# Patient Record
Sex: MALE | Employment: PART TIME | URBAN - METROPOLITAN AREA
[De-identification: names, ages, dates, MRNs, and addresses within clinical notes are randomized per-mention and may not be internally consistent; named-entity substitution may affect disease eponyms.]

---

## 2018-03-02 ENCOUNTER — RECORDS - HEALTHEAST (OUTPATIENT)
Dept: LAB | Facility: CLINIC | Age: 53
End: 2018-03-02

## 2018-03-02 LAB
ALBUMIN SERPL-MCNC: 3.7 G/DL (ref 3.5–5)
ALP SERPL-CCNC: 89 U/L (ref 45–120)
ALT SERPL W P-5'-P-CCNC: 22 U/L (ref 0–45)
ANION GAP SERPL CALCULATED.3IONS-SCNC: 9 MMOL/L (ref 5–18)
AST SERPL W P-5'-P-CCNC: 11 U/L (ref 0–40)
BILIRUB SERPL-MCNC: 0.5 MG/DL (ref 0–1)
BUN SERPL-MCNC: 11 MG/DL (ref 8–22)
CALCIUM SERPL-MCNC: 9.2 MG/DL (ref 8.5–10.5)
CHLORIDE BLD-SCNC: 106 MMOL/L (ref 98–107)
CHOLEST SERPL-MCNC: 164 MG/DL
CO2 SERPL-SCNC: 26 MMOL/L (ref 22–31)
CREAT SERPL-MCNC: 0.87 MG/DL (ref 0.7–1.3)
FASTING STATUS PATIENT QL REPORTED: ABNORMAL
GFR SERPL CREATININE-BSD FRML MDRD: >60 ML/MIN/1.73M2
GLUCOSE BLD-MCNC: 113 MG/DL (ref 70–125)
HDLC SERPL-MCNC: 36 MG/DL
LDLC SERPL CALC-MCNC: 104 MG/DL
POTASSIUM BLD-SCNC: 3.8 MMOL/L (ref 3.5–5)
PROT SERPL-MCNC: 6.7 G/DL (ref 6–8)
SODIUM SERPL-SCNC: 141 MMOL/L (ref 136–145)
TRIGL SERPL-MCNC: 120 MG/DL
TSH SERPL DL<=0.005 MIU/L-ACNC: 0.77 UIU/ML (ref 0.3–5)

## 2018-03-05 LAB — 25(OH)D3 SERPL-MCNC: 43.7 NG/ML (ref 30–80)

## 2018-03-06 ENCOUNTER — THERAPY VISIT (OUTPATIENT)
Dept: PHYSICAL THERAPY | Facility: CLINIC | Age: 53
End: 2018-03-06
Payer: MEDICARE

## 2018-03-06 DIAGNOSIS — M25.561 RIGHT KNEE PAIN: Primary | ICD-10-CM

## 2018-03-06 PROCEDURE — 97110 THERAPEUTIC EXERCISES: CPT | Mod: GP | Performed by: PHYSICAL THERAPIST

## 2018-03-06 PROCEDURE — 97162 PT EVAL MOD COMPLEX 30 MIN: CPT | Mod: GP | Performed by: PHYSICAL THERAPIST

## 2018-03-06 PROCEDURE — G8978 MOBILITY CURRENT STATUS: HCPCS | Mod: GP | Performed by: PHYSICAL THERAPIST

## 2018-03-06 PROCEDURE — G8979 MOBILITY GOAL STATUS: HCPCS | Mod: GP | Performed by: PHYSICAL THERAPIST

## 2018-03-06 NOTE — LETTER
"DEPARTMENT OF HEALTH AND HUMAN SERVICES  CENTERS FOR MEDICARE & MEDICAID SERVICES    PLAN/UPDATED PLAN OF PROGRESS FOR OUTPATIENT REHABILITATION    PATIENTS NAME:  Mohsen Najera   : 1965  PROVIDER NUMBER:    [id number]  [id number]  PROVIDER NAME: [place] [facility] PHYSICAL THERAPY  MEDICAL RECORD NUMBER: [id number]   START OF CARE DATE:  SOC Date: 18   TYPE:  PT  PRIMARY/TREATMENT DIAGNOSIS: (Pertinent Medical Diagnosis)  Right knee pain    VISITS FROM START OF CARE:  Rxs Used: 1   Physical Therapy Initial Evaluation   3/6/18  Nevaeh Henderson MD Precautions/Restrictions/MD instructions: Acute pain right knee, up to 12 visits   Therapist Impression:   Pt is a 51 y/o male, with short-term history of R knee pain. Pt presents with pain, reduced ROM, laxity, weakness, gait abnormalities, and balance deficits. These impairments limit their ability to ambulate, navigate stairs, stand, perform work duties, and sleep. Skilled PT services necessary in order to reduce impairments and improve independent function.  Subjective:   Chief Complaint: (**Patient a fair historian and without assistance today). Pt reports that he began having R knee pain in the past week or so. Cannot recall any type of trauma or slip. Notes that standing for long periods of time and navigating stairs are very painful. Notes crepitus with ascending stairs - only navigates them one step at a time. Pt reports that he is prescribed ankle braces but doesn't wear them - reports that they are to help rotate feet forward.  DOI/onset: MD order 3/6/18 DOS: n/a  Location: knee cap Quality: sharp  Frequency: activity related Radiates: none  Pain scale: Rest 0/10 Activity 9/10   Time of day: activity related Sleeping: occasionally can wake him up   Exacerbated by: ascending stairs and standing for long periods of time Relieved by: sitting and \"ignoring it\" Progression: no better if not worse  Previous Treatment: " rest Effect of prior treatment: no change   PMH and/or surgical history: mental illness  Imaging: x ray    PATIENTS NAME:  Mohsen Najera   : 1965  Occupation: [employer] Job duties: prolonged standing   Current HEP/exercise regimen: planning on doing  in  - bikes a lot Patient's goals: be able to reduce pain with daily activity  Medications: pt reports only taking Vitamin D  General health as reported by patient: Good  Return to MD: PRN    Objective:  Knee Evaluation:  Observation:    Stands in tibial ER and trunk flexion    Gait: incr tibial ER and femoral IR    Functional:    Single-Leg Stance: 5 sec on L, unable on R   Double-Leg Squat: tibial ER and femoral IR    Effusion Stroke Test: None    Patella: WNL     PROM:   Knee End Feel   Left 5-0-130 WNL   Right 10-0-115 Painful ext/flex     Special Tests:   L (+/-) R (+/-)   Anterior Drawer - -   Posterior Drawer - -   Lachman's - -   Valgus 0/30 degrees -/- -/-   Varus 0/30 degrees -/- -/-   Laxity Testing - equivocal   Meniscal Testing - -   Patellar Apprehension - -   NAJMA - -   Mohsen - -   Ely's - -   90/90 Hamstring - -       PATIENTS NAME:  Mohsen Najera   : 1965    Strength:   MMT L MMT R   HIP     Ext 3/5 3/5   Abd 3/5 3/5   ER 4/5 4/5   KNEE     Quad Set good Good but painful   SLR good 10 deg lag     Assessment/Plan:    Patient is a 52 year old male with right side knee complaints.    Patient has the following significant findings with corresponding treatment plan.                Diagnosis 1:  R knee pain, laxity  Pain -  hot/cold therapy, manual therapy, splint/taping/bracing/orthotics, education and home program  Decreased ROM/flexibility - manual therapy and therapeutic exercise  Decreased joint mobility - manual therapy and therapeutic exercise  Decreased strength - therapeutic exercise and therapeutic activities  Impaired balance - neuro re-education and therapeutic activities  Decreased  proprioception - neuro re-education and therapeutic activities  Impaired gait - gait training  Impaired muscle performance - neuro re-education  Decreased function - therapeutic activities  Impaired posture - neuro re-education  Instability -  Therapeutic Activity  Therapeutic Exercise    Therapy Evaluation Codes:   1) History comprised of:   Personal factors that impact the plan of care:      Past/current experiences and Time since onset of symptoms.    Comorbidity factors that impact the plan of care are:      Mental illness.     Medications impacting care: None.  2) Examination of Body Systems comprised of:   Body structures and functions that impact the plan of care:      Hip and Knee.   Activity limitations that impact the plan of care are:      Sitting, Squatting/kneeling, Stairs, Standing, Walking and Sleeping.  3) Clinical presentation characteristics are:   Stable/Uncomplicated.  4) Decision-Making    Moderate complexity using standardized patient assessment instrument and/or   measureable assessment of functional outcome.    Cumulative Therapy Evaluation is: Moderate complexity.    PATIENTS NAME:  Mohsen Najera   : 1965    Previous and current functional limitations:  (See Goal Flow Sheet for this information)    Short term and Long term goals: (See Goal Flow Sheet for this information)     Communication ability:  Patient appears to be able to clearly communicate and understand verbal and written communication and follow directions correctly.  Patient is unable to clearly communicate and follow directions.  They will require assistance to perform a home exercise program.  Treatment Explanation - The following has been discussed with the patient:   RX ordered/plan of care  Anticipated outcomes  Possible risks and side effects  This patient would benefit from PT intervention to resume normal activities.   Rehab potential is good.    Frequency:  1 X week, once daily  Duration:  for 6  "weeks  Discharge Plan:  Achieve all LTG.  Independent in home treatment program.  Reach maximal therapeutic benefit.    Please refer to the daily flowsheet for treatment today, total treatment time and time spent performing 1:1 timed codes.           Caregiver Signature/Credentials _____________________________ Date ________        Daron Thomas DPT   I have reviewed and certified the need for these services and plan of treatment while under my care.          PHYSICIAN'S SIGNATURE:   ______________________________________ Date___________     Nevaeh Henderson PA-C    Certification period:  Beginning of Cert date period: 03/06/18 to  End of Cert period date: 06/03/18     Functional Level Progress Report: Please see attached \"Goal Flow sheet for Functional level.\"    ____X____ Continue Services or       ________ DC Services                Service dates: From  SOC Date: 03/06/18 date to present                         "

## 2018-03-06 NOTE — MR AVS SNAPSHOT
"              After Visit Summary   3/6/2018    Mohsen Najera    MRN: [id number]           Patient Information     Date Of Birth          1965        Visit Information        Provider Department      3/6/2018 8:20 AM Daron Thomas PT [facility] Athletic [facility] Physical [facility]        Today's Diagnoses     Right knee pain    -  1       Follow-ups after your visit        Who to contact     If you have questions or need follow up information about today's clinic visit or your schedule please contact [facility] ATHLETIC [facility] PHYSICAL [facility] directly at [phone].  Normal or non-critical lab and imaging results will be communicated to you by [email or web address], letter or phone within 4 business days after the clinic has received the results. If you do not hear from us within 7 days, please contact the clinic through [email or web address] or phone. If you have a critical or abnormal lab result, we will notify you by phone as soon as possible.  Submit refill requests through Artaic or call your pharmacy and they will forward the refill request to us. Please allow 3 business days for your refill to be completed.          Additional Information About Your Visit        MyChart Information     Artaic lets you send messages to your doctor, view your test results, renew your prescriptions, schedule appointments and more. To sign up, go to [email or web address] . Click on \"Log in\" on the left side of the screen, which will take you to the Welcome page. Then click on \"Sign up Now\" on the right side of the page.     You will be asked to enter the access code listed below, as well as some personal information. Please follow the directions to create your username and password.     Your access code is: [email or web address]  Expires: 2018  9:54 AM     Your access code will  in 90 days. If you need help or a new code, please call your [facility] clinic or [phone].        Care EveryWhere ID     This " is your Care EveryWhere ID. This could be used by other organizations to access your [facility] medical records  [id number]         Blood Pressure from Last 3 Encounters:   No data found for BP    Weight from Last 3 Encounters:   No data found for Wt              We Performed the Following     HC PT EVAL, MODERATE COMPLEXITY     ISRAEL CERT REPORT     ISRAEL INITIAL EVAL REPORT     THERAPEUTIC EXERCISES        Primary Care Provider Office Phone # Fax #    Nevaeh GARCIA PURNIMA Henderson [phone]       [facility] [address] [facility] [address] [address]        Equal Access to Services     [facility]: Hadii aad ku hadasho Soomaali, waaxda luqadaha, qaybta kaalmada adeegyada, waxay idiin hayaan adeeg kharasurendra ladario . So [facility] [phone].    ATENCIÓN: Si habla español, tiene a carrizales disposición servicios gratuitos de asistencia lingüística. [facility] [phone].    We comply with applicable federal civil rights laws and [place] laws. We do not discriminate on the basis of race, color, national origin, age, disability, sex, sexual orientation, or gender identity.            Thank you!     Thank you for choosing [facility] PHYSICAL THERAPY  for your care. Our goal is always to provide you with excellent care. Hearing back from our patients is one way we can continue to improve our services. Please take a few minutes to complete the written survey that you may receive in the mail after your visit with us. Thank you!             Your Updated Medication List - Protect others around you: Learn how to safely use, store and throw away your medicines at www.disposemymeds.org.      Notice  As of 3/6/2018  9:54 AM    You have not been prescribed any medications.

## 2018-03-06 NOTE — PROGRESS NOTES
"[place] [facility] Initial Evaluation  Subjective:  Physical Therapy Initial Evaluation   3/6/18  Nevaeh Henderson MD Precautions/Restrictions/MD instructions: Acute pain right knee, up to 12 visits   Therapist Impression:   Pt is a 53 y/o male, with short-term history of R knee pain. Pt presents with pain, reduced ROM, laxity, weakness, gait abnormalities, and balance deficits. These impairments limit their ability to ambulate, navigate stairs, stand, perform work duties, and sleep. Skilled PT services necessary in order to reduce impairments and improve independent function.    Subjective:   Chief Complaint: (**Patient a fair historian and without assistance today). Pt reports that he began having R knee pain in the past week or so. Cannot recall any type of trauma or slip. Notes that standing for long periods of time and navigating stairs are very painful. Notes crepitus with ascending stairs - only navigates them one step at a time. Pt reports that he is prescribed ankle braces but doesn't wear them - reports that they are to help rotate feet forward.  DOI/onset: MD order 3/6/18 DOS: n/a  Location: knee cap Quality: sharp  Frequency: activity related Radiates: none  Pain scale: Rest 0/10 Activity 9/10   Time of day: activity related Sleeping: occasionally can wake him up   Exacerbated by: ascending stairs and standing for long periods of time Relieved by: sitting and \"ignoring it\" Progression: no better if not worse  Previous Treatment: rest Effect of prior treatment: no change   PMH and/or surgical history: mental illness  Imaging: x ray    Occupation: [employer] Job duties: prolonged standing   Current HEP/exercise regimen: planning on doing  in June - bikes a lot Patient's goals: be able to reduce pain with daily activity  Medications: pt reports only taking Vitamin D  General health as reported by patient: Good  Return to MD: PRN    HPI                "     Objective:  Knee Evaluation:  Observation:    Stands in tibial ER and trunk flexion    Gait: incr tibial ER and femoral IR    Functional:    Single-Leg Stance: 5 sec on L, unable on R   Double-Leg Squat: tibial ER and femoral IR    Effusion Stroke Test: None    Patella: WNL     PROM:   Knee End Feel   Left 5-0-130 WNL   Right 10-0-115 Painful ext/flex     Special Tests:   L (+/-) R (+/-)   Anterior Drawer - -   Posterior Drawer - -   Lachman's - -   Valgus 0/30 degrees -/- -/-   Varus 0/30 degrees -/- -/-   Laxity Testing - equivocal   Meniscal Testing - -   Patellar Apprehension - -   NAJMA - -   Mohsen - -   Ely's - -   90/90 Hamstring - -     Strength:   MMT L MMT R   HIP     Ext 3/5 3/5   Abd 3/5 3/5   ER 4/5 4/5   KNEE     Quad Set good Good but painful   SLR good 10 deg lag       System    Physical Exam    General     ROS    Assessment/Plan:    Patient is a 52 year old male with right side knee complaints.    Patient has the following significant findings with corresponding treatment plan.                Diagnosis 1:  R knee pain, laxity  Pain -  hot/cold therapy, manual therapy, splint/taping/bracing/orthotics, education and home program  Decreased ROM/flexibility - manual therapy and therapeutic exercise  Decreased joint mobility - manual therapy and therapeutic exercise  Decreased strength - therapeutic exercise and therapeutic activities  Impaired balance - neuro re-education and therapeutic activities  Decreased proprioception - neuro re-education and therapeutic activities  Impaired gait - gait training  Impaired muscle performance - neuro re-education  Decreased function - therapeutic activities  Impaired posture - neuro re-education  Instability -  Therapeutic Activity  Therapeutic Exercise    Therapy Evaluation Codes:   1) History comprised of:   Personal factors that impact the plan of care:      Past/current experiences and Time since onset of symptoms.    Comorbidity factors that impact the  plan of care are:      Mental illness.     Medications impacting care: None.  2) Examination of Body Systems comprised of:   Body structures and functions that impact the plan of care:      Hip and Knee.   Activity limitations that impact the plan of care are:      Sitting, Squatting/kneeling, Stairs, Standing, Walking and Sleeping.  3) Clinical presentation characteristics are:   Stable/Uncomplicated.  4) Decision-Making    Moderate complexity using standardized patient assessment instrument and/or measureable assessment of functional outcome.  Cumulative Therapy Evaluation is: Moderate complexity.    Previous and current functional limitations:  (See Goal Flow Sheet for this information)    Short term and Long term goals: (See Goal Flow Sheet for this information)     Communication ability:  Patient appears to be able to clearly communicate and understand verbal and written communication and follow directions correctly.  Patient is unable to clearly communicate and follow directions.  They will require assistance to perform a home exercise program.  Treatment Explanation - The following has been discussed with the patient:   RX ordered/plan of care  Anticipated outcomes  Possible risks and side effects  This patient would benefit from PT intervention to resume normal activities.   Rehab potential is good.    Frequency:  1 X week, once daily  Duration:  for 6 weeks  Discharge Plan:  Achieve all LTG.  Independent in home treatment program.  Reach maximal therapeutic benefit.    Please refer to the daily flowsheet for treatment today, total treatment time and time spent performing 1:1 timed codes.

## 2018-03-13 ENCOUNTER — THERAPY VISIT (OUTPATIENT)
Dept: PHYSICAL THERAPY | Facility: CLINIC | Age: 53
End: 2018-03-13
Payer: MEDICARE

## 2018-03-13 DIAGNOSIS — M25.561 RIGHT KNEE PAIN: ICD-10-CM

## 2018-03-13 PROCEDURE — 97112 NEUROMUSCULAR REEDUCATION: CPT | Mod: GP | Performed by: PHYSICAL THERAPIST

## 2018-03-20 ENCOUNTER — THERAPY VISIT (OUTPATIENT)
Dept: PHYSICAL THERAPY | Facility: CLINIC | Age: 53
End: 2018-03-20
Payer: MEDICARE

## 2018-03-20 DIAGNOSIS — M25.561 RIGHT KNEE PAIN: ICD-10-CM

## 2018-03-20 PROCEDURE — 97530 THERAPEUTIC ACTIVITIES: CPT | Mod: GP | Performed by: PHYSICAL THERAPIST

## 2018-03-20 PROCEDURE — 97112 NEUROMUSCULAR REEDUCATION: CPT | Mod: GP | Performed by: PHYSICAL THERAPIST

## 2018-03-27 ENCOUNTER — THERAPY VISIT (OUTPATIENT)
Dept: PHYSICAL THERAPY | Facility: CLINIC | Age: 53
End: 2018-03-27
Payer: MEDICARE

## 2018-03-27 DIAGNOSIS — M25.561 RIGHT KNEE PAIN: ICD-10-CM

## 2018-03-27 PROCEDURE — 97110 THERAPEUTIC EXERCISES: CPT | Mod: GP | Performed by: PHYSICAL THERAPIST

## 2018-03-27 PROCEDURE — 97112 NEUROMUSCULAR REEDUCATION: CPT | Mod: GP | Performed by: PHYSICAL THERAPIST

## 2018-04-19 ENCOUNTER — THERAPY VISIT (OUTPATIENT)
Dept: PHYSICAL THERAPY | Facility: CLINIC | Age: 53
End: 2018-04-19
Payer: MEDICARE

## 2018-04-19 DIAGNOSIS — M25.561 RIGHT KNEE PAIN: ICD-10-CM

## 2018-04-19 PROCEDURE — 97110 THERAPEUTIC EXERCISES: CPT | Mod: GP | Performed by: PHYSICAL THERAPIST

## 2018-04-19 PROCEDURE — 97112 NEUROMUSCULAR REEDUCATION: CPT | Mod: GP | Performed by: PHYSICAL THERAPIST

## 2018-05-10 ENCOUNTER — THERAPY VISIT (OUTPATIENT)
Dept: PHYSICAL THERAPY | Facility: CLINIC | Age: 53
End: 2018-05-10
Payer: MEDICARE

## 2018-05-10 DIAGNOSIS — M25.561 RIGHT KNEE PAIN: ICD-10-CM

## 2018-05-10 PROCEDURE — 97112 NEUROMUSCULAR REEDUCATION: CPT | Mod: GP | Performed by: PHYSICAL THERAPIST

## 2018-05-10 PROCEDURE — G8979 MOBILITY GOAL STATUS: HCPCS | Mod: GP | Performed by: PHYSICAL THERAPIST

## 2018-05-10 PROCEDURE — 97110 THERAPEUTIC EXERCISES: CPT | Mod: GP | Performed by: PHYSICAL THERAPIST

## 2018-05-10 PROCEDURE — G8978 MOBILITY CURRENT STATUS: HCPCS | Mod: GP | Performed by: PHYSICAL THERAPIST

## 2018-05-10 NOTE — LETTER
[facility] ATHLETIC [facility] PHYSICAL THERAPY  [address] [address]  [phone]    May 11, 2018    Re: Mohsen Najera   :   1965  MRN:  [id number]   REFERRING PHYSICIAN:   Nevaeh Henderson    [facility]    Date of Initial Evaluation:  2018  Visits:  Rxs Used: 6  Reason for Referral:  Right knee pain    EVALUATION SUMMARY    Assessment/Plan:    PROGRESS  REPORT    Progress reporting period is from 3/6/18 to 5/10/18.       SUBJECTIVE  Subjective changes noted by patient:   Subjective: Pt reports that he was able to bike 15 miles last Monday without issues. Pt reports that his knee felt good. Pt reports that he still has some pain with going up stairs (3/10).    Current Pain level: 0/10.      Initial Pain level: 6/10.   Changes in function:  Yes (See Goal flowsheet attached for changes in current functional level)  Adverse reaction to treatment or activity: None    OBJECTIVE  Changes noted in objective findings:  Yes,   Objective: 11 sit to stands in 30 sec w/o pain. No pain with knee ROM.     ASSESSMENT/PLAN  Updated problem list and treatment plan: Diagnosis 1:  R knee pain  Pain -  home program  Decreased strength - home program  Impaired muscle performance - home program  Decreased function - home program  STG/LTGs have been met or progress has been made towards goals:  Yes (See Goal flow sheet completed today.)  Assessment of Progress: The patient's condition is improving.  The patient has met all of their long term goals.  Self Management Plans:  Patient is independent in a home treatment program.  Mohsen continues to require the following intervention to meet STG and LTG's:  PT intervention is no longer required to meet STG/LTG.    Re: Mohsen Najera   :   1965      Recommendations:  This patient is ready to be discharged from therapy and continue their home treatment program.    Please refer to the  daily flowsheet for treatment today, total treatment time and time spent performing 1:1 timed codes.    Thank you for your referral.    INQUIRIES  Therapist: Daron Thomas DPT   [facility] PHYSICAL THERAPY  [address] [place] [id number]  Phone: [phone]  Fax: [phone]

## 2018-05-10 NOTE — PROGRESS NOTES
Subjective:  HPI                    Objective:  System    Physical Exam    General     ROS    Assessment/Plan:    PROGRESS  REPORT    Progress reporting period is from 3/6/18 to 5/10/18.       SUBJECTIVE  Subjective changes noted by patient:   Subjective: Pt reports that he was able to bike 15 miles last Monday without issues. Pt reports that his knee felt good. Pt reports that he still has some pain with going up stairs (3/10).    Current Pain level: 0/10.      Initial Pain level: 6/10.   Changes in function:  Yes (See Goal flowsheet attached for changes in current functional level)  Adverse reaction to treatment or activity: None    OBJECTIVE  Changes noted in objective findings:  Yes,   Objective: 11 sit to stands in 30 sec w/o pain. No pain with knee ROM.     ASSESSMENT/PLAN  Updated problem list and treatment plan: Diagnosis 1:  R knee pain  Pain -  home program  Decreased strength - home program  Impaired muscle performance - home program  Decreased function - home program  STG/LTGs have been met or progress has been made towards goals:  Yes (See Goal flow sheet completed today.)  Assessment of Progress: The patient's condition is improving.  The patient has met all of their long term goals.  Self Management Plans:  Patient is independent in a home treatment program.  Mohsen continues to require the following intervention to meet STG and LTG's:  PT intervention is no longer required to meet STG/LTG.    Recommendations:  This patient is ready to be discharged from therapy and continue their home treatment program.    Please refer to the daily flowsheet for treatment today, total treatment time and time spent performing 1:1 timed codes.

## 2018-05-10 NOTE — MR AVS SNAPSHOT
"              After Visit Summary   5/10/2018    Mohsen Najera    MRN: [id number]           Patient Information     Date Of Birth          1965        Visit Information        Provider Department      5/10/2018 3:20 PM Daron Thomas PT [facility]        Today's Diagnoses     Right knee pain           Follow-ups after your visit        Who to contact     If you have questions or need follow up information about today's clinic visit or your schedule please contact [facility] directly at [phone].  Normal or non-critical lab and imaging results will be communicated to you by [employer], letter or phone within 4 business days after the clinic has received the results. If you do not hear from us within 7 days, please contact the clinic through [employer]im[employer] or phone. If you have a critical or abnormal lab result, we will notify you by phone as soon as possible.  Submit refill requests through [employer] or call your pharmacy and they will forward the refill request to us. Please allow 3 business days for your refill to be completed.          Additional Information About Your Visit        MyChart Information     [employer] lets you send messages to your doctor, view your test results, renew your prescriptions, schedule appointments and more. To sign up, go to [email or web address] . Click on \"Log in\" on the left side of the screen, which will take you to the Welcome page. Then click on \"Sign up Now\" on the right side of the page.     You will be asked to enter the access code listed below, as well as some personal information. Please follow the directions to create your username and password.     Your access code is: [email or web address]  Expires: 2018 10:54 AM     Your access code will  in 90 days. If you need help or a new code, please call your [facility] clinic or [phone].        Care EveryWhere ID     This is " your Care EveryWhere ID. This could be used by other organizations to access your [facility] medical records  [id number]         Blood Pressure from Last 3 Encounters:   No data found for BP    Weight from Last 3 Encounters:   No data found for Wt              We Performed the Following     [facility] PROGRESS NOTES REPORT     NEUROMUSCULAR RE-EDUCATION     THERAPEUTIC EXERCISES        Primary Care Provider Office Phone # Fax #    Nevaeh Henderson PA-C [phone]       [facility] [address] [address]        Equal Access to Services     DARSHAN LARIOS : Hadii aad ku hadasho Soomaali, waaxda luqadaha, qaybta kaalmada adeegyada, waxay idiin hayaan adeeg kharash la'clive . So [facility] [phone].    ATENCIÓN: Si habla español, tiene a carrizales disposición servicios gratuitos de asistencia lingüística. Ll[facility] [phone].    We comply with applicable federal civil rights laws and [place] laws. We do not discriminate on the basis of race, color, national origin, age, disability, sex, sexual orientation, or gender identity.            Thank you!     Thank you for choosing [facility]  for your care. Our goal is always to provide you with excellent care. Hearing back from our patients is one way we can continue to improve our services. Please take a few minutes to complete the written survey that you may receive in the mail after your visit with us. Thank you!             Your Updated Medication List - Protect others around you: Learn how to safely use, store and throw away your medicines at www.disposemymeds.org.      Notice  As of 5/10/2018  3:48 PM    You have not been prescribed any medications.

## 2019-03-29 ENCOUNTER — RECORDS - HEALTHEAST (OUTPATIENT)
Dept: LAB | Facility: CLINIC | Age: 54
End: 2019-03-29

## 2019-03-29 LAB
ANION GAP SERPL CALCULATED.3IONS-SCNC: 11 MMOL/L (ref 5–18)
BUN SERPL-MCNC: 14 MG/DL (ref 8–22)
CALCIUM SERPL-MCNC: 9.8 MG/DL (ref 8.5–10.5)
CHLORIDE BLD-SCNC: 108 MMOL/L (ref 98–107)
CO2 SERPL-SCNC: 22 MMOL/L (ref 22–31)
CREAT SERPL-MCNC: 0.94 MG/DL (ref 0.7–1.3)
GFR SERPL CREATININE-BSD FRML MDRD: >60 ML/MIN/1.73M2
GLUCOSE BLD-MCNC: 119 MG/DL (ref 70–125)
POTASSIUM BLD-SCNC: 4 MMOL/L (ref 3.5–5)
SODIUM SERPL-SCNC: 141 MMOL/L (ref 136–145)

## 2019-04-01 LAB — 25(OH)D3 SERPL-MCNC: 26.6 NG/ML (ref 30–80)

## 2019-11-19 ENCOUNTER — RECORDS - HEALTHEAST (OUTPATIENT)
Dept: LAB | Facility: CLINIC | Age: 54
End: 2019-11-19

## 2019-11-19 LAB — PSA SERPL-MCNC: 1.2 NG/ML (ref 0–3.5)

## 2019-11-20 LAB — 25(OH)D3 SERPL-MCNC: 24.4 NG/ML (ref 30–80)

## 2020-08-04 ENCOUNTER — RECORDS - HEALTHEAST (OUTPATIENT)
Dept: LAB | Facility: CLINIC | Age: 55
End: 2020-08-04

## 2020-08-04 LAB
ALBUMIN SERPL-MCNC: 3.9 G/DL (ref 3.5–5)
ALP SERPL-CCNC: 75 U/L (ref 45–120)
ALT SERPL W P-5'-P-CCNC: 30 U/L (ref 0–45)
ANION GAP SERPL CALCULATED.3IONS-SCNC: 11 MMOL/L (ref 5–18)
AST SERPL W P-5'-P-CCNC: 15 U/L (ref 0–40)
BILIRUB SERPL-MCNC: 0.6 MG/DL (ref 0–1)
BUN SERPL-MCNC: 11 MG/DL (ref 8–22)
CALCIUM SERPL-MCNC: 8.7 MG/DL (ref 8.5–10.5)
CHLORIDE BLD-SCNC: 104 MMOL/L (ref 98–107)
CHOLEST SERPL-MCNC: 173 MG/DL
CO2 SERPL-SCNC: 24 MMOL/L (ref 22–31)
CREAT SERPL-MCNC: 0.94 MG/DL (ref 0.7–1.3)
FASTING STATUS PATIENT QL REPORTED: ABNORMAL
GFR SERPL CREATININE-BSD FRML MDRD: >60 ML/MIN/1.73M2
GLUCOSE BLD-MCNC: 99 MG/DL (ref 70–125)
HDLC SERPL-MCNC: 30 MG/DL
LDLC SERPL CALC-MCNC: 110 MG/DL
POTASSIUM BLD-SCNC: 3.6 MMOL/L (ref 3.5–5)
PROT SERPL-MCNC: 6.8 G/DL (ref 6–8)
SODIUM SERPL-SCNC: 139 MMOL/L (ref 136–145)
TRIGL SERPL-MCNC: 166 MG/DL

## 2022-06-22 ENCOUNTER — LAB REQUISITION (OUTPATIENT)
Dept: LAB | Facility: CLINIC | Age: 57
End: 2022-06-22
Payer: MEDICARE

## 2022-06-22 DIAGNOSIS — K21.9 GASTRO-ESOPHAGEAL REFLUX DISEASE WITHOUT ESOPHAGITIS: ICD-10-CM

## 2022-06-22 DIAGNOSIS — I10 ESSENTIAL (PRIMARY) HYPERTENSION: ICD-10-CM

## 2022-06-22 PROCEDURE — 80048 BASIC METABOLIC PNL TOTAL CA: CPT | Mod: ORL | Performed by: PHYSICIAN ASSISTANT

## 2022-06-22 PROCEDURE — 83735 ASSAY OF MAGNESIUM: CPT | Mod: ORL | Performed by: PHYSICIAN ASSISTANT

## 2022-06-23 LAB
ANION GAP SERPL CALCULATED.3IONS-SCNC: 9 MMOL/L (ref 5–18)
BUN SERPL-MCNC: 16 MG/DL (ref 8–22)
CALCIUM SERPL-MCNC: 9.1 MG/DL (ref 8.5–10.5)
CHLORIDE BLD-SCNC: 109 MMOL/L (ref 98–107)
CO2 SERPL-SCNC: 24 MMOL/L (ref 22–31)
CREAT SERPL-MCNC: 0.96 MG/DL (ref 0.7–1.3)
GFR SERPL CREATININE-BSD FRML MDRD: >90 ML/MIN/1.73M2
GLUCOSE BLD-MCNC: 113 MG/DL (ref 70–125)
MAGNESIUM SERPL-MCNC: 2 MG/DL (ref 1.8–2.6)
POTASSIUM BLD-SCNC: 4.1 MMOL/L (ref 3.5–5)
SODIUM SERPL-SCNC: 142 MMOL/L (ref 136–145)

## 2022-11-04 ENCOUNTER — LAB REQUISITION (OUTPATIENT)
Dept: LAB | Facility: CLINIC | Age: 57
End: 2022-11-04
Payer: MEDICARE

## 2022-11-04 DIAGNOSIS — E55.9 VITAMIN D DEFICIENCY, UNSPECIFIED: ICD-10-CM

## 2022-11-04 DIAGNOSIS — I10 ESSENTIAL (PRIMARY) HYPERTENSION: ICD-10-CM

## 2022-11-04 DIAGNOSIS — Z13.220 ENCOUNTER FOR SCREENING FOR LIPOID DISORDERS: ICD-10-CM

## 2022-11-04 DIAGNOSIS — K21.9 GASTRO-ESOPHAGEAL REFLUX DISEASE WITHOUT ESOPHAGITIS: ICD-10-CM

## 2022-11-04 LAB
ANION GAP SERPL CALCULATED.3IONS-SCNC: 12 MMOL/L (ref 7–15)
BUN SERPL-MCNC: 13.5 MG/DL (ref 6–20)
CALCIUM SERPL-MCNC: 9.6 MG/DL (ref 8.6–10)
CHLORIDE SERPL-SCNC: 104 MMOL/L (ref 98–107)
CHOLEST SERPL-MCNC: 194 MG/DL
CREAT SERPL-MCNC: 0.97 MG/DL (ref 0.67–1.17)
DEPRECATED HCO3 PLAS-SCNC: 22 MMOL/L (ref 22–29)
GFR SERPL CREATININE-BSD FRML MDRD: >90 ML/MIN/1.73M2
GLUCOSE SERPL-MCNC: 106 MG/DL (ref 70–99)
HDLC SERPL-MCNC: 37 MG/DL
LDLC SERPL CALC-MCNC: 115 MG/DL
MAGNESIUM SERPL-MCNC: 1.8 MG/DL (ref 1.7–2.3)
NONHDLC SERPL-MCNC: 157 MG/DL
POTASSIUM SERPL-SCNC: 4.3 MMOL/L (ref 3.4–5.3)
SODIUM SERPL-SCNC: 138 MMOL/L (ref 136–145)
TRIGL SERPL-MCNC: 210 MG/DL

## 2022-11-04 PROCEDURE — 80048 BASIC METABOLIC PNL TOTAL CA: CPT | Mod: ORL | Performed by: PHYSICIAN ASSISTANT

## 2022-11-04 PROCEDURE — 83735 ASSAY OF MAGNESIUM: CPT | Mod: ORL | Performed by: PHYSICIAN ASSISTANT

## 2022-11-04 PROCEDURE — 80061 LIPID PANEL: CPT | Mod: ORL | Performed by: PHYSICIAN ASSISTANT

## 2022-11-04 PROCEDURE — 82306 VITAMIN D 25 HYDROXY: CPT | Mod: ORL | Performed by: PHYSICIAN ASSISTANT

## 2022-11-05 LAB — DEPRECATED CALCIDIOL+CALCIFEROL SERPL-MC: 16 UG/L (ref 20–75)

## 2022-12-06 ENCOUNTER — TRANSFERRED RECORDS (OUTPATIENT)
Dept: HEALTH INFORMATION MANAGEMENT | Facility: CLINIC | Age: 57
End: 2022-12-06

## 2022-12-07 ENCOUNTER — MEDICAL CORRESPONDENCE (OUTPATIENT)
Dept: HEALTH INFORMATION MANAGEMENT | Facility: CLINIC | Age: 57
End: 2022-12-07

## 2022-12-14 ENCOUNTER — TRANSFERRED RECORDS (OUTPATIENT)
Dept: HEALTH INFORMATION MANAGEMENT | Facility: CLINIC | Age: 57
End: 2022-12-14

## 2022-12-29 ENCOUNTER — TRANSCRIBE ORDERS (OUTPATIENT)
Dept: OTHER | Age: 57
End: 2022-12-29

## 2022-12-29 ENCOUNTER — TELEPHONE (OUTPATIENT)
Dept: OPHTHALMOLOGY | Facility: CLINIC | Age: 57
End: 2022-12-29

## 2022-12-29 ENCOUNTER — MEDICAL CORRESPONDENCE (OUTPATIENT)
Dept: HEALTH INFORMATION MANAGEMENT | Facility: CLINIC | Age: 57
End: 2022-12-29

## 2022-12-29 DIAGNOSIS — H02.432 PARALYTIC PTOSIS OF LEFT EYELID: Primary | ICD-10-CM

## 2022-12-29 NOTE — TELEPHONE ENCOUNTER
[facility] Call Center    Phone Message    May a detailed message be left on voicemail: no     Reason for Call: Appointment Intake    Referring Provider Name: Saleem Sanchez   [address] [address]  Phone: [phone], Fax: [phone]  Diagnosis and/or Symptoms: Neuro Ophth - Paralytic ptosis of left eyelid    This referral came over as Urgent for pt to be seen within 3-5 days.  Please review    Action Taken: Message routed to:  [facility]    Travel Screening: Not Applicable

## 2022-12-30 NOTE — TELEPHONE ENCOUNTER
Called x 2 and LVM     Mohsen can make an appointment with any neuro ophth for next available     Suzie Roa Communication Facilitator on 12/30/2022 at 10:37 AM

## 2023-01-10 ENCOUNTER — TELEPHONE (OUTPATIENT)
Dept: OPHTHALMOLOGY | Facility: CLINIC | Age: 58
End: 2023-01-10

## 2023-01-10 NOTE — TELEPHONE ENCOUNTER
I called [facility]     We called Mohsen 12/29 and 1/5     We have tried calling this pt many times and we left messages with the call center number for Mohsen Connolly can be scheduled for next available with neuro     Suzie Roa Communication Facilitator on 1/10/2023 at 10:47 AM

## 2023-01-10 NOTE — TELEPHONE ENCOUNTER
[facility] Call Center    Phone Message    May a detailed message be left on voicemail: yes     Reason for Call: Appointment Intake    Referring Provider Name: Saleem Garcia PA-C   Diagnosis and/or Symptoms: Urgent 3-5 Days referral for Neuro-Oph. regarding Neuro Ophth - Paralytic ptosis of left eyelid        Action Taken: Message routed to:  [facility]    Travel Screening: Not Applicable

## 2023-01-27 ENCOUNTER — LAB REQUISITION (OUTPATIENT)
Dept: LAB | Facility: CLINIC | Age: 58
End: 2023-01-27
Payer: MEDICARE

## 2023-01-27 DIAGNOSIS — G80.9 CEREBRAL PALSY, UNSPECIFIED (H): ICD-10-CM

## 2023-01-27 LAB — TSH SERPL DL<=0.005 MIU/L-ACNC: 1.03 UIU/ML (ref 0.3–4.2)

## 2023-01-27 PROCEDURE — 84443 ASSAY THYROID STIM HORMONE: CPT | Mod: ORL | Performed by: PHYSICIAN ASSISTANT

## 2023-02-06 ENCOUNTER — LAB (OUTPATIENT)
Dept: LAB | Facility: CLINIC | Age: 58
End: 2023-02-06
Attending: PHYSICIAN ASSISTANT
Payer: MEDICARE

## 2023-02-06 ENCOUNTER — OFFICE VISIT (OUTPATIENT)
Dept: OPHTHALMOLOGY | Facility: CLINIC | Age: 58
End: 2023-02-06
Attending: PHYSICIAN ASSISTANT
Payer: MEDICARE

## 2023-02-06 DIAGNOSIS — H02.403 INVOLUTIONAL PTOSIS, ACQUIRED, BILATERAL: ICD-10-CM

## 2023-02-06 DIAGNOSIS — G70.00 MYASTHENIA GRAVIS (H): ICD-10-CM

## 2023-02-06 DIAGNOSIS — H02.432 PARALYTIC PTOSIS OF LEFT EYELID: Primary | ICD-10-CM

## 2023-02-06 DIAGNOSIS — H02.432 PARALYTIC PTOSIS OF LEFT EYELID: ICD-10-CM

## 2023-02-06 PROCEDURE — 83516 IMMUNOASSAY NONANTIBODY: CPT

## 2023-02-06 PROCEDURE — 92060 SENSORIMOTOR EXAMINATION: CPT | Mod: 26 | Performed by: OPHTHALMOLOGY

## 2023-02-06 PROCEDURE — 92285 EXTERNAL OCULAR PHOTOGRAPHY: CPT | Performed by: OPHTHALMOLOGY

## 2023-02-06 PROCEDURE — 83519 RIA NONANTIBODY: CPT

## 2023-02-06 PROCEDURE — 86256 FLUORESCENT ANTIBODY TITER: CPT

## 2023-02-06 PROCEDURE — 86255 FLUORESCENT ANTIBODY SCREEN: CPT

## 2023-02-06 PROCEDURE — 92060 SENSORIMOTOR EXAMINATION: CPT | Performed by: OPHTHALMOLOGY

## 2023-02-06 PROCEDURE — G0463 HOSPITAL OUTPT CLINIC VISIT: HCPCS | Mod: 25

## 2023-02-06 PROCEDURE — 99204 OFFICE O/P NEW MOD 45 MIN: CPT | Mod: GC | Performed by: OPHTHALMOLOGY

## 2023-02-06 PROCEDURE — 36415 COLL VENOUS BLD VENIPUNCTURE: CPT

## 2023-02-06 ASSESSMENT — VISUAL ACUITY
OS_SC: 20/60
METHOD: SNELLEN - LINEAR TF
OD_SC: 20/60
OD_CC: 20/20
OS_CC: 20/20
OS_SC+: -2

## 2023-02-06 ASSESSMENT — REFRACTION_WEARINGRX
OD_CYLINDER: +1.25
OS_CYLINDER: +0.75
OD_AXIS: 090
OS_SPHERE: -2.25
OS_AXIS: 115
OD_SPHERE: -3.00

## 2023-02-06 ASSESSMENT — LAGOPHTHALMOS
OS_LAGOPHTHALMOS: 0
OD_LAGOPHTHALMOS: 0

## 2023-02-06 ASSESSMENT — LEVATOR FUNCTION
OS_LEVATOR: 2
OD_LEVATOR: 12

## 2023-02-06 ASSESSMENT — TONOMETRY
OD_IOP_MMHG: 14
OS_IOP_MMHG: 16
IOP_METHOD: ICARE

## 2023-02-06 ASSESSMENT — MARGIN REFLEX DISTANCE
OD_MRD2: 4
OD_MRD1: 1
OS_MRD1: -3

## 2023-02-06 NOTE — NURSING NOTE
Chief Complaint(s) and History of Present Illness(es)     Droopy Eye Lid Evaluation            Laterality: right upper lid and left upper lid    Associated signs and symptoms: Negative for blurred vision, double vision and eye pain    Comments: Ptosis started in October 2022  MRI/MRA were normal  MG labs - other provider mentioned they would order MG labs but I do not see the results in the chart  PMHx: hx of CP. Has been falling more often since ptosis, but they are not sure if due to weakness or vision.  No changes with swallowing or speech.   No diplopia.   Margarito notices that eyelid height improves when he takes a warm shower sometimes.

## 2023-02-06 NOTE — NURSING NOTE
Chief Complaint(s) and History of Present Illness(es)     Droopy Eye Lid Evaluation            Laterality: right upper lid and left upper lid    Associated signs and symptoms: Negative for blurred vision, double vision and eye pain    Comments: Ptosis started in October 2022  MRI/MRA were normal  MG labs - other provider mentioned they would order MG labs but I do not see the results in the chart  PMHx: hx of CP. Has been falling more often since ptosis, but they are not sure if due to weakness or vision.  No changes with swallowing or speech.   No diplopia.

## 2023-02-06 NOTE — PROGRESS NOTES
Oculoplastic Clinic New Patient    Patient: Mohsen Najera MRN# [id number]   YOB: 1965 Age: 57 year old   Date of Visit: Feb 6, 2023    CC: Droopy eyelids obstructing vision.              HPI:     Chief Complaint(s) and History of Present Illness(es)     Droopy Eye Lid Evaluation            Laterality: right upper lid and left upper lid    Associated signs and symptoms: Negative for blurred vision, double vision and eye pain  Comments: Ptosis started in October 2022  MRI/MRA were normal MG labs - other provider mentioned they would order MG labs but I do not see the results in the chart  PMHx: hx of CP. Has been falling more often since ptosis, but they are not sure if due to weakness or vision.  No changes with swallowing or speech.   No diplopia.   Margarito notices that eyelid height improves when he takes a warm shower   sometimes.          Patient here with friend/manager of his supportive apartment who is helping provide history. Patient noticing drooping of left eyelid since October 2022. Has always had drooping of both upper eyelids and right eye appears at baseline. They state the left eye just suddenly drooped shut and has not gotten any worse. It occasionally opens back up slightly after a warm shower. Has also had some weakness and soreness on his left side. No diplopia, difficulty swallowing or changes in speech.    The droopy eyelid is interfering with activities of daily living including walking, balance and reading. Has had more falls since increase in drooping of left upper lid.    EXAM:       Dermatochalasis with excess skin touching eyelashes no  Brow ptosis with brow resting below superior orbital rim yes    VISUAL FIELD:  Unable to obtain due to cognitive delay.     Assessment & Plan     Mohsen Najera is a 57 year old male with the following diagnoses:   1. Paralytic ptosis of left eyelid    2. Involutional ptosis, acquired, bilateral       Paralytic ptosis, left upper eyelid>>  right upper eyelid.  Unusual that they report sudden onset.  MRI/A has been obtained but results are note available to us. We will need to obtain the records.  Brow ptosis, both eyes  - Concern for myasthenia gravis  - Minimal improvement in ptosis with ice testing in clinic today  - Recommend obtaining labs: myasthenia gravis panel  If MRI/A normal (having results faxed), and normal MG labs, consider:     Both upper eyelid ptosis repair (small ellipse of skin, levator advancement)  Number to call Noreen: [phone]  We discussed he has poor levator function on the left so it is possible we will not achieve adequate lift, in which case we can consider frontalis sling.   ANTICOAGULATION:  Not on any anticoagulation     PHOTOS DEMONSTRATE:  Significant dermatochalasis with lids resting on eyelashes and obstructing visual axis  Blepharoptosis  Brow ptosis with thicker brow skin and hairs below the lateral superior orbital rim    Brea Belcher MD  Resident Physician, PGY-2  Department of Ophthalmology    Addendum: MRI/A report were obtained.   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 These are unlikely related to his new onset ptosis. Striated muscle antibodies have returned positive. Binding, blocking and modulating are positive. Given clinically suspicion was high for myasthenia, I will ask him to see neuromuscular neurology. Additionally his care was discussed with neurointerventional radiology (Dr. Melvin)  and they agreed to follow him for his incidentally identified aneurysm.    Attending Physician Attestation: Complete documentation of historical and exam elements from today's encounter can be found in the full encounter summary report (not reduplicated in this progress note). I personally obtained the chief complaint(s) and history of present illness. I confirmed and edited as necessary the review of systems, past  medical/surgical history, family history, social history, and examination findings as documented by others; and I examined the patient myself. I personally reviewed the relevant tests, images, and reports as documented above. I formulated and edited as necessary the assessment and plan and discussed the findings and management plan with the patient. Rakesh Chun MD      Today with Mohsen Najera I reviewed the indications, risks, benefits, and alternatives of the proposed surgical procedure including, but not limited to, failure obtain the desired result  and need for additional surgery, bleeding, infection, loss of vision, loss of the eye, and the remote possibility of permanent damage to any organ system or death with the use of anesthesia.  I provided multiple opportunities for the questions, answered all questions to the best of my ability, and confirmed that my answers and my discussion were understood.

## 2023-02-09 LAB
ACHR BLOCK AB/ACHR TOTAL SFR SER: 50 %
STRIA MUS IGG SER QL IF: DETECTED
STRIA MUS IGG TITR SER IF: ABNORMAL {TITER}

## 2023-02-10 LAB — ACHR BIND AB SER-SCNC: 2.6 NMOL/L

## 2023-02-11 LAB — ACHR MOD AB/ACHR TOTAL SFR SER: 56 %

## 2023-03-29 ENCOUNTER — TELEPHONE (OUTPATIENT)
Dept: OPHTHALMOLOGY | Facility: CLINIC | Age: 58
End: 2023-03-29
Payer: MEDICARE

## 2023-03-29 NOTE — TELEPHONE ENCOUNTER
Mohsen's care coordinator Elias called to get more information about his appointment on 2/6 Dr Todd. Elias was at the appointment but states that the appointment was very vague. She was given a number to call to schedule an appointment but she was unaware that it was with a Neurologist, his appointment is not till August.     Elias said that Mohsen's eyes are worse, he is falling because he cant open his eyes and he is declining. Can someone call and talk with her about the referral to Ricki. And what the next step is with his eyelids      Is there anything we can do to get Mohsen in earlier with Ricki.     Elias's contact info [phone]    Thank you

## 2023-03-30 ENCOUNTER — TELEPHONE (OUTPATIENT)
Dept: OPHTHALMOLOGY | Facility: CLINIC | Age: 58
End: 2023-03-30
Payer: MEDICARE

## 2023-03-30 NOTE — TELEPHONE ENCOUNTER
Spoke with patient's , Elias, regarding provider think patient has myasthenia gravis as the underlying cause of his ptosis. Therefore it needs to be managed medically first, which is why he was sent for neuromuscular neurology evaluation. Rescheduled patient for a sooner appointment and added patient to the wait-list at all locations. Sent new reminder letter to confirmed address.-Per Patient's Elias

## 2023-05-10 ENCOUNTER — OFFICE VISIT (OUTPATIENT)
Dept: NEUROLOGY | Facility: CLINIC | Age: 58
End: 2023-05-10
Payer: MEDICARE

## 2023-05-10 VITALS — HEART RATE: 75 BPM | SYSTOLIC BLOOD PRESSURE: 124 MMHG | DIASTOLIC BLOOD PRESSURE: 83 MMHG

## 2023-05-10 DIAGNOSIS — M53.82 NECK MUSCLE WEAKNESS: Primary | ICD-10-CM

## 2023-05-10 DIAGNOSIS — R25.0 ABNORMAL HEAD MOVEMENTS: ICD-10-CM

## 2023-05-10 PROCEDURE — 99205 OFFICE O/P NEW HI 60 MIN: CPT | Performed by: PSYCHIATRY & NEUROLOGY

## 2023-05-10 RX ORDER — AMLODIPINE BESYLATE 5 MG/1
5 TABLET ORAL DAILY
COMMUNITY
Start: 2023-04-17

## 2023-05-10 RX ORDER — FLUOXETINE 10 MG/1
10 CAPSULE ORAL DAILY
COMMUNITY
Start: 2023-04-24

## 2023-05-10 RX ORDER — SUMATRIPTAN 50 MG/1
50 TABLET, FILM COATED ORAL DAILY PRN
COMMUNITY
Start: 2023-03-02

## 2023-05-10 RX ORDER — ERGOCALCIFEROL 1.25 MG/1
CAPSULE ORAL
COMMUNITY

## 2023-05-10 RX ORDER — ACETAMINOPHEN 500 MG
TABLET ORAL
COMMUNITY

## 2023-05-10 RX ORDER — LORATADINE 10 MG/1
10 TABLET ORAL DAILY
COMMUNITY

## 2023-05-10 NOTE — LETTER
"    5/10/2023         RE: Mohsen Najera  [address]  [place] [address]        Dear Colleague,    Thank you for referring your patient, Mohsen Najera, to the [facility]. Please see a copy of my visit note below.    Department of Neurology  Movement Disorders Division     Patient: Mohsen Najera   MRN: [id number]   : 1965   Date of Visit: 05/10/23     Dear Colleague,     Thank you for referring your patient, Mr. Najera, to the [facility] NEUROLOGY at [facility]. Please see a copy of my visit note below.    Referring Provider: Rakesh Chun MD    History of Present Illness  Mr. Najera is a 57 year old male with PMH of cerebral palsy that presents to Neurology Movement clinic as a new patient for evaluation of involuntary and persistent head nodding.    Patient seen by Rakesh Chun MD on 23 for bilateral ptosis, L > R, improved with a warm shower.      History obtained from patient. Patient accompanied by Noreen, supportive .   Patient reports   Onset: 3-4 months ago became more noticeable and bad when it became to hurt neck and jaw. Piror to he had slight head nods.  Notices eye close more when he is tired. Has to open eye manually to see. Reports neck/head wants to rest on chest. Both began around the same time, about 3-4 months ago.   Character: Head nodding and may be aggressive to \"gentle\". He is able to stop these movements by putting his hand under his chin. Denies a warning prior to these movements, except when he is nodding yes.  Location: head/neck movements   Frequency: Will occur 2 times an hour.   Duration: \"Depends.\" Has lasted up to a minute.   Associated symptoms: Not sure this is associated but has also noticed left side weakness, bilateral ptosis worse when he is tired   Triggers: Saying yes may trigger head movements.   Improved with: extending neck up, " putting hand under chin  Failed therapies: n/a   Previous head/neck/back injury? Yes LOC? denies  FHx: Denies similar symptoms in the family.       Review of Systems:  Other than that mentioned above, the remainder of 12 systems reviewed were negative.    PMH: noncontributory   PSH: noncontributory   FH: noncontributory   SH: noncontributory     Medications:  Current Outpatient Medications   Medication Sig Dispense Refill     acetaminophen (TYLENOL) 500 MG tablet TAKE 2 TABLETS BY MOUTH EVERY 8 HOURS PRN       amLODIPine (NORVASC) 5 MG tablet Take 5 mg by mouth daily       ergocalciferol (ERGOCALCIFEROL) 1.25 MG (84647 UT) capsule TAKE 1 CAPSULE BY MOUTH WEEKLY       FLUoxetine (PROZAC) 10 MG capsule Take 10 mg by mouth daily       loratadine (CLARITIN) 10 MG tablet Take 10 mg by mouth daily       omeprazole (PRILOSEC) 20 MG DR capsule Take 20 mg by mouth daily       SUMAtriptan (IMITREX) 50 MG tablet Take 50 mg by mouth daily as needed           No Known Allergies      Physical Exam:  The patient's  blood pressure is 124/83 and his pulse is 75.    Physical Exam:  GENERAL: alert, active, attentive, appropriately groomed   HEENT: normocephalic, eyes open with no discharge, nares patent, oropharynx clear-no lesions  CHEST: non labored breathing, normal configuration, chest rise equal b/l  EXTREMITIES: no edema/cyanosis in BUE/BLE, distal pulses intact  PSYCH: mood stable     Neurologic Exam:  MENTAL STATUS: Alert and oriented to person, place, time, and situation. Follows commands. Recent and remote memory intact. Attention span and concentration intact. Fund of knowledge intact to current events.   SPEECH: Fluent, intact comprehension and articulation  CN: visual fields intact, EOMIB, bilateral ptosis, unable to perform fatigue test with eyes and he could not open eye, facial sensation intact, facial movement symmetric, hearing grossly intact to conversation, tongue protrudes midline   MOTOR: Moves all extremities  equally against gravity without difficulty with strength in BUE/BLE involving 4/4 ShAbd, 4/4 ElbFlex, 5/5 ElbEx, 4+/4- HipFlex, 5/5 KneeExt, 5/4 KneeFlex, 5/4 ADF, neck extension 4, neck extension 4  Able to carry on in conversation without difficulty, pauses/rests  Involuntary movements:   Agility: Normal finger tapping on right and slower on the left and normal toe tapping b/l  Tone: Normal axial and appendicular tone  REFLEXES (R/L): 2/2  in biceps, brachioradialis, triceps, patellars, achilles; no clonus, toes down going  SENSATION: intact to light touch throughout   COORDINATION: no dysmetria with FTN, HTS  GAIT: able to rise unassisted from a seated position, decreased arm swing and normal stride length, walks with cane, right hip externally rotated with walking, no en bloc turns, no ataxia    Data Reviewed: I have personally reviewed the tests/studies below.   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 Binding, blocking and modulating are positive.     Impression:  Mohsen Najera is a 57 year old male with involuntary head-nodding in setting of muscle weakness with positive labs associated with myasthenia gravis.    Head nod: The movements of the head seem more behavioral in setting of neck weakness. He denies a build up sensation prior to the movements. Movements can be controlled to a certain degree.   Labs positive for Myasthenia gravis including striated muscle antibodies have returned positive. Binding, blocking and modulating are positive.     Plan:   - Treat underlying condition for neck weakness   - Seek immediate medical attention/evaluation if you notice trouble breathing or keeping with conversation, worsening weakness, trouble swallowing   - Continue appointment with Dr. Lion for evaluation of Myasthenia Gravis  - Continue to follow with Dr. Melvin,  neurointerventional radiology, for incidental aneurysm finding   - Will try to see if he is able to seen sooner by Dr. Lion . If this cannot be scheduled sooner with Dr. Lion, will try to schedule with Dr. Thompson or general neurology.    Patient to return prn    Hattie Marcano DO, MA   of Neurology   [facility]     60 minutes spent on date of encounter doing chart reviews and exam and documentation and further activities as noted above.                Again, thank you for allowing me to participate in the care of your patient.        Sincerely,        Hattie Marcano DO

## 2023-05-10 NOTE — NURSING NOTE
"Chief Complaint   Patient presents with     Head nodding     \"Uncontrollable\"  Referred by Dr. Sams         Left side weakness       TAM Murphy on 5/10/2023 at 3:06 PM    "

## 2023-05-10 NOTE — PROGRESS NOTES
"Department of Neurology  Movement Disorders Division     Patient: Mohsen Najera   MRN: [id number]   : 1965   Date of Visit: 05/10/23     Dear Colleague,     Thank you for referring your patient, Mr. Najera, to the [facility] NEUROLOGY at [facility]. Please see a copy of my visit note below.    Referring Provider: Rakesh Chun MD    History of Present Illness  Mr. Najera is a 57 year old male with PMH of cerebral palsy that presents to Neurology Movement clinic as a new patient for evaluation of involuntary and persistent head nodding.    Patient seen by Rakesh Chun MD on 23 for bilateral ptosis, L > R, improved with a warm shower.      History obtained from patient. Patient accompanied by Noreen, supportive .   Patient reports   Onset: 3-4 months ago became more noticeable and bad when it became to hurt neck and jaw. Piror to he had slight head nods.  Notices eye close more when he is tired. Has to open eye manually to see. Reports neck/head wants to rest on chest. Both began around the same time, about 3-4 months ago.   Character: Head nodding and may be aggressive to \"gentle\". He is able to stop these movements by putting his hand under his chin. Denies a warning prior to these movements, except when he is nodding yes.  Location: head/neck movements   Frequency: Will occur 2 times an hour.   Duration: \"Depends.\" Has lasted up to a minute.   Associated symptoms: Not sure this is associated but has also noticed left side weakness, bilateral ptosis worse when he is tired   Triggers: Saying yes may trigger head movements.   Improved with: extending neck up, putting hand under chin  Failed therapies: n/a   Previous head/neck/back injury? Yes LOC? denies  FHx: Denies similar symptoms in the family.       Review of Systems:  Other than that mentioned above, the remainder of 12 systems reviewed were negative.    PMH: noncontributory   PSH: " noncontributory   FH: noncontributory   SH: noncontributory     Medications:  Current Outpatient Medications   Medication Sig Dispense Refill     acetaminophen (TYLENOL) 500 MG tablet TAKE 2 TABLETS BY MOUTH EVERY 8 HOURS PRN       amLODIPine (NORVASC) 5 MG tablet Take 5 mg by mouth daily       ergocalciferol (ERGOCALCIFEROL) 1.25 MG (50480 UT) capsule TAKE 1 CAPSULE BY MOUTH WEEKLY       FLUoxetine (PROZAC) 10 MG capsule Take 10 mg by mouth daily       loratadine (CLARITIN) 10 MG tablet Take 10 mg by mouth daily       omeprazole (PRILOSEC) 20 MG DR capsule Take 20 mg by mouth daily       SUMAtriptan (IMITREX) 50 MG tablet Take 50 mg by mouth daily as needed           No Known Allergies      Physical Exam:  The patient's  blood pressure is 124/83 and his pulse is 75.    Physical Exam:  GENERAL: alert, active, attentive, appropriately groomed   HEENT: normocephalic, eyes open with no discharge, nares patent, oropharynx clear-no lesions  CHEST: non labored breathing, normal configuration, chest rise equal b/l  EXTREMITIES: no edema/cyanosis in BUE/BLE, distal pulses intact  PSYCH: mood stable     Neurologic Exam:  MENTAL STATUS: Alert and oriented to person, place, time, and situation. Follows commands. Recent and remote memory intact. Attention span and concentration intact. Fund of knowledge intact to current events.   SPEECH: Fluent, intact comprehension and articulation  CN: visual fields intact, EOMIB, bilateral ptosis, unable to perform fatigue test with eyes and he could not open eye, facial sensation intact, facial movement symmetric, hearing grossly intact to conversation, tongue protrudes midline   MOTOR: Moves all extremities equally against gravity without difficulty with strength in BUE/BLE involving 4/4 ShAbd, 4/4 ElbFlex, 5/5 ElbEx, 4+/4- HipFlex, 5/5 KneeExt, 5/4 KneeFlex, 5/4 ADF, neck extension 4, neck extension 4  Able to carry on in conversation without difficulty, pauses/rests  Involuntary  movements:   Agility: Normal finger tapping on right and slower on the left and normal toe tapping b/l  Tone: Normal axial and appendicular tone  REFLEXES (R/L): 2/2  in biceps, brachioradialis, triceps, patellars, achilles; no clonus, toes down going  SENSATION: intact to light touch throughout   COORDINATION: no dysmetria with FTN, HTS  GAIT: able to rise unassisted from a seated position, decreased arm swing and normal stride length, walks with cane, right hip externally rotated with walking, no en bloc turns, no ataxia    Data Reviewed: I have personally reviewed the tests/studies below.   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 Binding, blocking and modulating are positive.     Impression:  Mohsen Najera is a 57 year old male with involuntary head-nodding in setting of muscle weakness with positive labs associated with myasthenia gravis.    Head nod: The movements of the head seem more behavioral in setting of neck weakness. He denies a build up sensation prior to the movements. Movements can be controlled to a certain degree.   Labs positive for Myasthenia gravis including striated muscle antibodies have returned positive. Binding, blocking and modulating are positive.     Plan:   - Treat underlying condition for neck weakness   - Seek immediate medical attention/evaluation if you notice trouble breathing or keeping with conversation, worsening weakness, trouble swallowing   - Continue appointment with Dr. Lion for evaluation of Myasthenia Gravis  - Continue to follow with Dr. Melvin, neurointerventional radiology, for incidental aneurysm finding   - Will try to see if he is able to seen sooner by Dr. Lion . If this cannot be scheduled sooner with Dr. Lion, will try to schedule with Dr. Thompson or general neurology.    Patient to return papo Kuhn  REG Marcano DO, MA   of Neurology   [facility]     60 minutes spent on date of encounter doing chart reviews and exam and documentation and further activities as noted above.

## 2023-05-24 NOTE — TELEPHONE ENCOUNTER
RECORDS RECEIVED FROM: internal   REASON FOR VISIT: MG   Date of Appt: 5/31/23   NOTES (FOR ALL VISITS) STATUS DETAILS   OFFICE NOTE from referring provider Internal Dr Marcano @ [facility]rology:  5/10/23   OFFICE NOTE from other specialist Internal Dr Rakesh Chun @ [facility]:  2/6/23   MEDICATION LIST Internal    IMAGING  (FOR ALL VISITS)     MRI (HEAD, NECK, SPINE) PACS [facility]:  MRI Brain 12/14/22  MRA Brain 12/14/22

## 2023-05-31 ENCOUNTER — OFFICE VISIT (OUTPATIENT)
Dept: NEUROLOGY | Facility: CLINIC | Age: 58
End: 2023-05-31
Payer: MEDICARE

## 2023-05-31 ENCOUNTER — PRE VISIT (OUTPATIENT)
Dept: NEUROLOGY | Facility: CLINIC | Age: 58
End: 2023-05-31

## 2023-05-31 ENCOUNTER — TELEPHONE (OUTPATIENT)
Dept: NEUROLOGY | Facility: CLINIC | Age: 58
End: 2023-05-31

## 2023-05-31 VITALS
DIASTOLIC BLOOD PRESSURE: 78 MMHG | SYSTOLIC BLOOD PRESSURE: 125 MMHG | RESPIRATION RATE: 16 BRPM | HEART RATE: 80 BPM | OXYGEN SATURATION: 96 %

## 2023-05-31 DIAGNOSIS — G70.01 MYASTHENIA GRAVIS WITH EXACERBATION (H): Primary | ICD-10-CM

## 2023-05-31 PROCEDURE — 99215 OFFICE O/P EST HI 40 MIN: CPT | Performed by: PSYCHIATRY & NEUROLOGY

## 2023-05-31 RX ORDER — PYRIDOSTIGMINE BROMIDE 60 MG/1
60 TABLET ORAL 3 TIMES DAILY
Qty: 90 TABLET | Refills: 3 | Status: SHIPPED | OUTPATIENT
Start: 2023-05-31 | End: 2023-09-18

## 2023-05-31 RX ORDER — PREDNISONE 10 MG/1
30 TABLET ORAL DAILY
Qty: 10 TABLET | Refills: 3 | Status: SHIPPED | OUTPATIENT
Start: 2023-05-31 | End: 2023-07-03

## 2023-05-31 ASSESSMENT — PAIN SCALES - GENERAL: PAINLEVEL: MILD PAIN (3)

## 2023-05-31 NOTE — LETTER
5/31/2023       RE: Mohsen Najera  [address]  [place] [address]     Dear Colleague,    Thank you for referring your patient, Mohsen Najera, to the [facility] NEUROLOGY CLINIC [place] at [facility]. Please see a copy of my visit note below.    Chief Complaint: myasthenia gravis    History of Present Illness:    Mohsen Najera is a 57 year old man who I am seeing at the request of Dr. Marcano for evaluation of myasthenia gravis. Margarito has a history of cerebral palsy. MG symptoms were first noticed in late 2022. The first symptom was left eyelid ptosis. Over the following weeks and months the right also became affected. Ptosis is fatigable. In the morning they are open but by mid day they are closed. No diplopia. In the spring 2023 he developed head drop. It was mild at first, but now he has a hard time holding his head off his chest. He can lift it up but can not keep it up. He has baseline slurred speech from cerebral palsy, but over the last couple months his speech has worsened. No dysphagia. No limb weakness. He is able to walk and climb stairs normally. No shortness of breath.     In February 2023 he was found to have Achr ab elevations. He has taken no immunotherapies or mestinon.     Prior pertinent laboratory work-up:  2/6/23: ACHr ab 2.6    Prior pertinent radiology work-up:  12/22: MRI brain showed no explanation for his symptoms    Prior electrophysiologic work-up:  None    Past Medical History:   Cerebral palsy  HTN  Myasthenia gravis    Past Surgical History:  None as an adult    Family history:    There is no known family history of hereditary neuropathies or other neuromuscular disorders.    Social History:    Rare alcohol. He denies tobacco or illicit drug use.     Medical Allergies:  No Known Allergies     Current Medications:    Current Outpatient Medications   Medication    acetaminophen (TYLENOL) 500 MG tablet     amLODIPine (NORVASC) 5 MG tablet    ergocalciferol (ERGOCALCIFEROL) 1.25 MG (93619 UT) capsule    FLUoxetine (PROZAC) 10 MG capsule    loratadine (CLARITIN) 10 MG tablet    omeprazole (PRILOSEC) 20 MG DR capsule    SUMAtriptan (IMITREX) 50 MG tablet     No current facility-administered medications for this visit.     Review of Systems: A complete review of systems was obtained and was negative except for what was noted above.    Physical examination:    /78   Pulse 80   Resp 16   SpO2 96%      General Appearance: NAD    Skin: There are no rashes or other skin lesions.    Musculoskeletal:  There is no scoliosis, lordosis, kyphosis, pes cavus, or hammertoes.    Neurologic examination:    Mental status:  Patient is alert, attentive, and oriented x 3.  Language is coherent and fluent without aphasia.  Memory, comprehension and ability to follow commands were intact.       Cranial nerves:  Ptosis present at baseline on both sides. Ptosis worsens with sustained upgaze. No diplopia. Smile is flat and he has trouble puffing his cheeks, but this is his CP baseline. Speech is moderately dysarthric.   Neck extension is 4-/5.     Motor exam: No atrophy or fasciculations. He has baseline left arm and leg weakness from CP. Power in the left arm and leg is generally 4/5. Right arm and leg strength is 5/5.     Complex motor skills: No tremor or ataxia    Sensory exam: Light touch intact in upper and lower limbs    Gait: Narrow and stable, but with head drop.    Deep tendon reflexes:   Right Left   Triceps 2 2   Biceps 2 2   Brachioradialis 2 2   Knee jerk 2 3   Ankle jerk 2 3      Myasthenia gravis outcomes   MG-ADL MG-Composite MG medications   5/31/23 5 9 none     Assessment:    Mohsen Najera is a 57 year old man with ACHr ab positive generalized myasthenia gravis. Thymoma status unknown. This has occurred on the background of cerebral palsy with chronic dysarthria and left arm/leg weakness, but clearly the MG  associated symptoms are different than this. We discussed approaches to treatment. Considering level of disability and impairment symptomatic and immunotherapy is indicated. His group home status presents some barriers, but none that are prohibitive. Will proceed as below.     Plan:      1. CT Chest: Evaluate for thymoma  2. Immunotherapy:   - Start prednisone 15 mg daily. Increase by 5 mg every 2 weeks to maximum dose of 30 mg daily. He can stop escalation at lowest effective dose. If needed, may go above 30 mg but not until we meet next.  - IST: Likely that he will need immunotherapy above prednisone, but will hold off for now. Although data on rituximab is not strong, considering need for only q 6 month (or sometimes less) dosing it may be an attractive option to try. Daily oral MMF is another option. I am less enthusiastic about FcRn or complement inhibitors that need frequent IV or subcutaneous infusions.   3. Symptomatic therapy:   - Start mestinon 60 mg TID. Side effects discussed.  4. Discussed potential MG triggers including heat, surgery, and illness. Certain medications and over the counter preparations may also cause worsening of MG symptoms. I advised him to tell any doctor or dentist about MG diagnosis. A list of cautionary medications can be found at [email or web address]://[email or web address]/What-is-MG/MG-Management/Cautionary-Drugs.  5. Follow up in 2 months. Should he developed worsening before then he should let me know ASAP. In that event acute intervention with IVIG or plex may be indicated.   ----            Again, thank you for allowing me to participate in the care of your patient.      Sincerely,    Jovany Lion MD

## 2023-05-31 NOTE — TELEPHONE ENCOUNTER
[facility] Call Center    Phone Message    May a detailed message be left on voicemail: no     Reason for Call: Other: Noreen calling to confirm the 8/14/23 appointment at 9:30.     Action Taken: Message routed to:  [facility]    Travel Screening: Not Applicable

## 2023-05-31 NOTE — TELEPHONE ENCOUNTER
Patient and  Noreen visited writer in pod 3J to check out. Check out instructions from Dr. Lion indicated follow up in 2 months (around 7/31/2023) however there are no regular openings until December. Scheduled first available 12/06 at 11:00am, routing to clinical team to see if we can use MARY JANE to get patient in closer to 2 months.    Per Noreen, please don't call patient to manage his care/appts. He gets overwhelmed and has trouble relaying what is he told to her. Patient and Noreen requested that we call her instead of patient. C2C is on file for Noreen. Added note to patient's demographics and permanent comments.    Elias Maldonado on 5/31/2023 at 12:22 PM

## 2023-05-31 NOTE — PATIENT INSTRUCTIONS
Start mestinon 60 mg once daily for 2 days, and then twice daily for x days and then three times daily  Start prednisone 15 mg per day for 2 weeks, then 20 mg daily for 2 weeks then 25 mg daily for 2 weeks then 30 mg daily. If your symptoms get better you can stay at the lowest effective dose

## 2023-05-31 NOTE — PROGRESS NOTES
Chief Complaint: myasthenia gravis    History of Present Illness:    Mohsen Najera is a 57 year old man who I am seeing at the request of Dr. Marcano for evaluation of myasthenia gravis. Margarito has a history of cerebral palsy. MG symptoms were first noticed in late 2022. The first symptom was left eyelid ptosis. Over the following weeks and months the right also became affected. Ptosis is fatigable. In the morning they are open but by mid day they are closed. No diplopia. In the spring 2023 he developed head drop. It was mild at first, but now he has a hard time holding his head off his chest. He can lift it up but can not keep it up. He has baseline slurred speech from cerebral palsy, but over the last couple months his speech has worsened. No dysphagia. No limb weakness. He is able to walk and climb stairs normally. No shortness of breath.     In February 2023 he was found to have Achr ab elevations. He has taken no immunotherapies or mestinon.     Prior pertinent laboratory work-up:  2/6/23: ACHr ab 2.6    Prior pertinent radiology work-up:  12/22: MRI brain showed no explanation for his symptoms    Prior electrophysiologic work-up:  None    Past Medical History:   Cerebral palsy  HTN  Myasthenia gravis    Past Surgical History:  None as an adult    Family history:    There is no known family history of hereditary neuropathies or other neuromuscular disorders.    Social History:    Rare alcohol. He denies tobacco or illicit drug use.     Medical Allergies:  No Known Allergies     Current Medications:    Current Outpatient Medications   Medication     acetaminophen (TYLENOL) 500 MG tablet     amLODIPine (NORVASC) 5 MG tablet     ergocalciferol (ERGOCALCIFEROL) 1.25 MG (82584 UT) capsule     FLUoxetine (PROZAC) 10 MG capsule     loratadine (CLARITIN) 10 MG tablet     omeprazole (PRILOSEC) 20 MG DR capsule     SUMAtriptan (IMITREX) 50 MG tablet     No current facility-administered medications for this visit.      Review of Systems: A complete review of systems was obtained and was negative except for what was noted above.    Physical examination:    /78   Pulse 80   Resp 16   SpO2 96%      General Appearance: NAD    Skin: There are no rashes or other skin lesions.    Musculoskeletal:  There is no scoliosis, lordosis, kyphosis, pes cavus, or hammertoes.    Neurologic examination:    Mental status:  Patient is alert, attentive, and oriented x 3.  Language is coherent and fluent without aphasia.  Memory, comprehension and ability to follow commands were intact.       Cranial nerves:  Ptosis present at baseline on both sides. Ptosis worsens with sustained upgaze. No diplopia. Smile is flat and he has trouble puffing his cheeks, but this is his CP baseline. Speech is moderately dysarthric.   Neck extension is 4-/5.     Motor exam: No atrophy or fasciculations. He has baseline left arm and leg weakness from CP. Power in the left arm and leg is generally 4/5. Right arm and leg strength is 5/5.     Complex motor skills: No tremor or ataxia    Sensory exam: Light touch intact in upper and lower limbs    Gait: Narrow and stable, but with head drop.    Deep tendon reflexes:   Right Left   Triceps 2 2   Biceps 2 2   Brachioradialis 2 2   Knee jerk 2 3   Ankle jerk 2 3      Myasthenia gravis outcomes   MG-ADL MG-Composite MG medications   5/31/23 5 9 none     Assessment:    Mohsen Najera is a 57 year old man with ACHr ab positive generalized myasthenia gravis. Thymoma status unknown. This has occurred on the background of cerebral palsy with chronic dysarthria and left arm/leg weakness, but clearly the MG associated symptoms are different than this. We discussed approaches to treatment. Considering level of disability and impairment symptomatic and immunotherapy is indicated. His group home status presents some barriers, but none that are prohibitive. Will proceed as below.     Plan:      1. CT Chest: Evaluate for  thymoma  2. Immunotherapy:   - Start prednisone 15 mg daily. Increase by 5 mg every 2 weeks to maximum dose of 30 mg daily. He can stop escalation at lowest effective dose. If needed, may go above 30 mg but not until we meet next.  - IST: Likely that he will need immunotherapy above prednisone, but will hold off for now. Although data on rituximab is not strong, considering need for only q 6 month (or sometimes less) dosing it may be an attractive option to try. Daily oral MMF is another option. I am less enthusiastic about FcRn or complement inhibitors that need frequent IV or subcutaneous infusions.   3. Symptomatic therapy:   - Start mestinon 60 mg TID. Side effects discussed.  4. Discussed potential MG triggers including heat, surgery, and illness. Certain medications and over the counter preparations may also cause worsening of MG symptoms. I advised him to tell any doctor or dentist about MG diagnosis. A list of cautionary medications can be found at [email or web address]/What-[email or web address]-MG/MG-Management/Cautionary-Drugs.  5. Follow up in 2 months. Should he developed worsening before then he should let me know ASAP. In that event acute intervention with IVIG or plex may be indicated.   ----    6/1/23: CT chest showed no suspicious mass within the chest to suggest the presence of thymoma. There was a solid 4 mm pulmonary nodule at the right lower lobe and also a homogeneously enhancing 1.0 cm left adrenal nodule, presumably benign, such as adenoma.Will repeat CT is 6 months to assess for interval change.     7/5/23: Note from patients caretakers. They report that Margarito went straight to pred 30. Also reports that diarrhea has resolved.

## 2023-05-31 NOTE — TELEPHONE ENCOUNTER
Per SAMMY Escobar to offer 8/14 at 9:30 MARY JANE slot.     Left Voicemail (1st Attempt) for the patient to call back and schedule the following:    Appointment type: Return Myasthenia Gravis  Provider: Dr. Lion   Return date: 8/14 at 9:30am MARY JANE  Specialty phone number: [phone]  Additional appointment(s) needed: N/A  Additonal Notes: N/A    LVM for Noreen requesting call back. If Noreen calls back, please schedule as above if possible, if not please contact clinic coordinators.    Elias Maldonado on 5/31/2023 at 12:51 PM

## 2023-06-01 ENCOUNTER — ANCILLARY PROCEDURE (OUTPATIENT)
Dept: CT IMAGING | Facility: CLINIC | Age: 58
End: 2023-06-01
Attending: PSYCHIATRY & NEUROLOGY
Payer: MEDICARE

## 2023-06-01 VITALS — OXYGEN SATURATION: 93 % | DIASTOLIC BLOOD PRESSURE: 83 MMHG | HEART RATE: 75 BPM | SYSTOLIC BLOOD PRESSURE: 128 MMHG

## 2023-06-01 DIAGNOSIS — G70.01 MYASTHENIA GRAVIS WITH EXACERBATION (H): ICD-10-CM

## 2023-06-01 PROCEDURE — 71260 CT THORAX DX C+: CPT | Mod: MG | Performed by: RADIOLOGY

## 2023-06-01 PROCEDURE — G1010 CDSM STANSON: HCPCS | Mod: GC | Performed by: RADIOLOGY

## 2023-06-01 RX ORDER — IOPAMIDOL 755 MG/ML
95 INJECTION, SOLUTION INTRAVASCULAR ONCE
Status: COMPLETED | OUTPATIENT
Start: 2023-06-01 | End: 2023-06-01

## 2023-06-01 RX ADMIN — IOPAMIDOL 95 ML: 755 INJECTION, SOLUTION INTRAVASCULAR at 11:21

## 2023-06-22 ENCOUNTER — TELEPHONE (OUTPATIENT)
Dept: NEUROLOGY | Facility: CLINIC | Age: 58
End: 2023-06-22
Payer: MEDICARE

## 2023-06-22 NOTE — TELEPHONE ENCOUNTER
[facility] Call Center    Phone Message    May a detailed message be left on voicemail: yes     Reason for Call: Symptoms or Concerns     If patient has red-flag symptoms, warm transfer to triage line    Current symptom or concern: Patient is having side effects:  Severe diarrhea from Prednisone  Noreen requests a call back to discuss options    Symptoms have been present for:  7 day(s)    Has patient previously been seen for this? Yes    By ZITA Lion:     Date: ASAP    Are there any new or worsening symptoms? Yes:       Action Taken: Message routed to:  [facility]    Travel Screening: Not Applicable

## 2023-06-22 NOTE — TELEPHONE ENCOUNTER
Left [facility] for Noreen ( for Margarito) to discuss Margarito's current symptoms.    Rima Pastor RN

## 2023-06-23 NOTE — TELEPHONE ENCOUNTER
Spoke with Noreen.  She states that Margarito has had severe diarrhea since starting mestinon and prednisone.  His MG symptoms have also improved - ptosis is almost gone and he's able to hold his head up.  He reports feeling much better (except for the diarrhea). Currently taking 60 mg of mestinon TID and 20 mg of prednisone.  PCP has prescribed imodium, which has been helping. Will notify Dr. Lion.    Rima Pastor RN

## 2023-06-26 NOTE — TELEPHONE ENCOUNTER
M for Noreen to be sure she received the comments from Dr Lion to reduce the Mestinon to 1/2 pill (30 mg) TID. If diarrhea persists. he can stop the Mestinon.

## 2023-07-03 DIAGNOSIS — G70.01 MYASTHENIA GRAVIS WITH EXACERBATION (H): ICD-10-CM

## 2023-07-03 RX ORDER — PREDNISONE 10 MG/1
30 TABLET ORAL DAILY
Qty: 90 TABLET | Refills: 1 | Status: SHIPPED | OUTPATIENT
Start: 2023-07-03 | End: 2023-08-21

## 2023-07-07 ENCOUNTER — NURSE TRIAGE (OUTPATIENT)
Dept: NURSING | Facility: CLINIC | Age: 58
End: 2023-07-07
Payer: MEDICARE

## 2023-07-07 DIAGNOSIS — G70.01 MYASTHENIA GRAVIS WITH EXACERBATION (H): ICD-10-CM

## 2023-07-07 RX ORDER — PREDNISONE 10 MG/1
TABLET ORAL
Qty: 10 TABLET | OUTPATIENT
Start: 2023-07-07

## 2023-07-07 NOTE — TELEPHONE ENCOUNTER
Pharmacy called asking about prednisone.  Patient told pharmacist he is to be continuing to take it.  Rx sent on 7/3/23.  Pharmacy did not receive this.  Verbal order given to Zoë Mathew.    predniSONE (DELTASONE) 10 MG tablet 90 tablet 1 7/3/2023  No   Sig - Route: Take 3 tablets (30 mg) by mouth daily - Oral   Sent to pharmacy as: predniSONE 10 MG Oral Tablet (DELTASONE)      Subha ROGERS RN [facility]

## 2023-08-21 DIAGNOSIS — G70.01 MYASTHENIA GRAVIS WITH EXACERBATION (H): ICD-10-CM

## 2023-08-21 RX ORDER — PREDNISONE 10 MG/1
30 TABLET ORAL DAILY
Qty: 90 TABLET | Refills: 1 | Status: SHIPPED | OUTPATIENT
Start: 2023-08-21 | End: 2023-10-30

## 2023-09-18 DIAGNOSIS — G70.01 MYASTHENIA GRAVIS WITH EXACERBATION (H): ICD-10-CM

## 2023-09-18 RX ORDER — PYRIDOSTIGMINE BROMIDE 60 MG/1
TABLET ORAL
Qty: 90 TABLET | Refills: 2 | Status: SHIPPED | OUTPATIENT
Start: 2023-09-18 | End: 2024-01-08

## 2023-09-19 ENCOUNTER — LAB REQUISITION (OUTPATIENT)
Dept: LAB | Facility: CLINIC | Age: 58
End: 2023-09-19
Payer: MEDICARE

## 2023-09-19 DIAGNOSIS — E55.9 VITAMIN D DEFICIENCY, UNSPECIFIED: ICD-10-CM

## 2023-09-19 PROCEDURE — 82306 VITAMIN D 25 HYDROXY: CPT | Mod: ORL | Performed by: PHYSICIAN ASSISTANT

## 2023-09-21 LAB — DEPRECATED CALCIDIOL+CALCIFEROL SERPL-MC: 18 UG/L (ref 20–75)

## 2023-10-30 DIAGNOSIS — G70.01 MYASTHENIA GRAVIS WITH EXACERBATION (H): ICD-10-CM

## 2023-10-30 RX ORDER — PREDNISONE 10 MG/1
30 TABLET ORAL DAILY
Qty: 90 TABLET | Refills: 0 | Status: SHIPPED | OUTPATIENT
Start: 2023-10-30 | End: 2023-11-27

## 2023-11-07 ENCOUNTER — OFFICE VISIT (OUTPATIENT)
Dept: NEUROLOGY | Facility: CLINIC | Age: 58
End: 2023-11-07
Payer: MEDICARE

## 2023-11-07 VITALS
TEMPERATURE: 97.5 F | DIASTOLIC BLOOD PRESSURE: 91 MMHG | SYSTOLIC BLOOD PRESSURE: 131 MMHG | WEIGHT: 207.2 LBS | BODY MASS INDEX: 36.71 KG/M2 | HEIGHT: 63 IN | HEART RATE: 79 BPM

## 2023-11-07 DIAGNOSIS — G70.00 MYASTHENIA GRAVIS WITHOUT EXACERBATION (H): Primary | ICD-10-CM

## 2023-11-07 NOTE — PROGRESS NOTES
History of MG:    Mohsen Najera is a 58 year old man with ACHr ab positive generalized myasthenia gravis. He has a history of cerebral palsy. MG symptoms were first noticed in late 2022. The first symptom was left eyelid ptosis. Over the following weeks and months the right also became affected.  No diplopia. In the spring 2023 he developed head drop.  He has baseline slurred speech from cerebral palsy, but over the last couple months his speech has worsened. No dysphagia. No limb weakness. In February 2023 he was found to have Achr ab elevations. In 6/23 he started prednisone 30 mg daily.     Interval history:  I last saw him 5/31/23. After that visit he started prednisone, ramping up quickly to 30 gm daily. Since then his eyelid ptosis is much better and his head drop has improved. He still has a slouched posture, but this is his baseline. The head drop is much better. Eyelid ptosis is also nearly normalized. Sometimes mild ptosis occurs when he is tired. No definite diplopia. Unfortunately he has gained about 20 pounds. He has also feels more short of breath than he used to. He feels generally more weak, but sometimes things like stairs and walking is more difficult. He has dysarthria, but this is probably at his baseline for CP. There may be slightly more slurred speech at the end of the day. He also continues mestinon TID. This is helpful for his ptosis.     Prior pertinent laboratory work-up:  2/6/23: ACHr ab 2.6    Prior pertinent radiology work-up:  12/22: MRI brain showed no explanation for his symptoms  6/1/23: CT chest showed no suspicious mass within the chest to suggest the presence of thymoma. There was a solid 4 mm pulmonary nodule at the right lower lobe and also a homogeneously enhancing 1.0 cm left adrenal nodule, presumably benign, such as adenoma.    Prior electrophysiologic work-up:  None    Past Medical History:   Cerebral palsy  HTN  Myasthenia gravis    Past Surgical History:  None as an  "adult    Family history:    There is no known family history of hereditary neuropathies or other neuromuscular disorders.    Social History:    Rare alcohol. He denies tobacco or illicit drug use.     Medical Allergies:  No Known Allergies     Current Medications:    Current Outpatient Medications   Medication    acetaminophen (TYLENOL) 500 MG tablet    amLODIPine (NORVASC) 5 MG tablet    ergocalciferol (ERGOCALCIFEROL) 1.25 MG (50249 UT) capsule    FLUoxetine (PROZAC) 10 MG capsule    loratadine (CLARITIN) 10 MG tablet    omeprazole (PRILOSEC) 20 MG DR capsule    predniSONE (DELTASONE) 10 MG tablet    pyRIDostigmine (MESTINON) 60 MG tablet    SUMAtriptan (IMITREX) 50 MG tablet     No current facility-administered medications for this visit.     Review of Systems: A complete review of systems was obtained and was negative except for what was noted above.    Physical examination:    BP (!) 131/91   Pulse 79   Temp 97.5  F (36.4  C) (Temporal)   Ht 1.6 m (5' 3\")   Wt 94 kg (207 lb 3.2 oz)   BMI 36.70 kg/m       General Appearance: NAD    Skin: There are no rashes or other skin lesions.    Musculoskeletal:  There is no scoliosis, lordosis, kyphosis, pes cavus, or hammertoes.    Neurologic examination:    Mental status:  Patient is alert, attentive, and oriented x 3.  Language is coherent and fluent without aphasia.  Memory, comprehension and ability to follow commands were intact.       Cranial nerves:  Ptosis present at baseline left more than right. Better than last visit. Eye closure is strong. No diplopia. Smile is full.  Speech is moderately dysarthric.   Neck extension is probably full     Motor exam: No atrophy or fasciculations. He has baseline left arm and leg weakness from CP. Power in the left arm and leg is probably full. He can easily rise from a seated position without assist.     Complex motor skills: No tremor or ataxia    Sensory exam: Light touch intact in upper and lower limbs    Gait: Narrow " and stable, but with head drop.    Deep tendon reflexes:   Right Left   Triceps 2 2   Biceps 2 2   Brachioradialis 2 2   Knee jerk 2 3   Ankle jerk 2 3      Myasthenia gravis outcomes   MG-ADL MG-Composite MG medications   5/31/23 5 9 none   11/7/23 3 5 Pred 30 mg daily     Assessment:    Mohsen Najera is a 58 year old man with ACHr ab positive non-thymomatous generalized myasthenia gravis. This has occurred on the background of cerebral palsy with chronic dysarthria and left arm/leg weakness.Compared to when we last met MG associated symptoms are clearly improved. Hard to know if his generalized weakness and SOB is MG related, but considering improvement in ptosis and head drop I think unlikely. May be the effects of steroid associated weight gain. His home status presents some barriers for treatment, but none that are prohibitive. Will proceed as below.     Plan:      1. Immunotherapy:   - Prednisone dose 30 mg daily. Will reduce to 25 mg x 2 weeks then 20 mg x 2 weeks, then 15 mg x 4 weeks, then 10 mg daily.   - IST: May need immunotherapy above prednisone, but will hold off for now. Although data on rituximab is not strong, considering need for only q 6 month (or sometimes less) dosing it may be an attractive option to try. Daily oral MMF is another option. I am less enthusiastic about FcRn or complement inhibitors that need frequent IV or subcutaneous infusions. Will make a determination on this pending clinical course with pred taper.  2. Symptomatic therapy:   - Continue mestinon 60 mg TID. Side effects discussed.  3. SOB: Will check PFT. Might be due to weight gain? Also asked him to discuss with PCP. It did occur after COVID over the summer  4. CT chest 6/23 showed no suspicious mass within the chest to suggest the presence of thymoma. There was a solid 4 mm pulmonary nodule at the right lower lobe and also a homogeneously enhancing 1.0 cm left adrenal nodule, presumably benign, such as adenoma.Will repeat  CT in 2024 to assess for interval change.   4. Discussed potential MG triggers including heat, surgery, and illness. Certain medications and over the counter preparations may also cause worsening of MG symptoms. I advised him to tell any doctor or dentist about MG diagnosis. A list of cautionary medications can be found at [email or web address].  5. Disclosures discussed and in AVS  6. Follow up in 4 months. Sooner if needed.  ----

## 2023-11-07 NOTE — PATIENT INSTRUCTIONS
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 These companies manufacture some products that are used to treat myasthenia gravis and that I am may recommending for you. I would be happy to respond to any concerns or questions you may have.

## 2023-11-07 NOTE — LETTER
11/7/2023       RE: Mohsen Najera  [address]  [place] [address]     Dear Colleague,    Thank you for referring your patient, Mohsen Najera, to the  [facility]. Please see a copy of my visit note below.    History of MG:    Mohsen Najera is a 58 year old man with ACHr ab positive generalized myasthenia gravis. He has a history of cerebral palsy. MG symptoms were first noticed in late 2022. The first symptom was left eyelid ptosis. Over the following weeks and months the right also became affected.  No diplopia. In the spring 2023 he developed head drop.  He has baseline slurred speech from cerebral palsy, but over the last couple months his speech has worsened. No dysphagia. No limb weakness. In February 2023 he was found to have Achr ab elevations. In 6/23 he started prednisone 30 mg daily.     Interval history:  I last saw him 5/31/23. After that visit he started prednisone, ramping up quickly to 30 gm daily. Since then his eyelid ptosis is much better and his head drop has improved. He still has a slouched posture, but this is his baseline. The head drop is much better. Eyelid ptosis is also nearly normalized. Sometimes mild ptosis occurs when he is tired. No definite diplopia. Unfortunately he has gained about 20 pounds. He has also feels more short of breath than he used to. He feels generally more weak, but sometimes things like stairs and walking is more difficult. He has dysarthria, but this is probably at his baseline for CP. There may be slightly more slurred speech at the end of the day. He also continues mestinon TID. This is helpful for his ptosis.     Prior pertinent laboratory work-up:  2/6/23: ACHr ab 2.6    Prior pertinent radiology work-up:  12/22: MRI brain showed no explanation for his symptoms  6/1/23: CT chest showed no suspicious mass within the chest to suggest the presence of  "thymoma. There was a solid 4 mm pulmonary nodule at the right lower lobe and also a homogeneously enhancing 1.0 cm left adrenal nodule, presumably benign, such as adenoma.    Prior electrophysiologic work-up:  None    Past Medical History:   Cerebral palsy  HTN  Myasthenia gravis    Past Surgical History:  None as an adult    Family history:    There is no known family history of hereditary neuropathies or other neuromuscular disorders.    Social History:    Rare alcohol. He denies tobacco or illicit drug use.     Medical Allergies:  No Known Allergies     Current Medications:    Current Outpatient Medications   Medication    acetaminophen (TYLENOL) 500 MG tablet    amLODIPine (NORVASC) 5 MG tablet    ergocalciferol (ERGOCALCIFEROL) 1.25 MG (77991 UT) capsule    FLUoxetine (PROZAC) 10 MG capsule    loratadine (CLARITIN) 10 MG tablet    omeprazole (PRILOSEC) 20 MG DR capsule    predniSONE (DELTASONE) 10 MG tablet    pyRIDostigmine (MESTINON) 60 MG tablet    SUMAtriptan (IMITREX) 50 MG tablet     No current facility-administered medications for this visit.     Review of Systems: A complete review of systems was obtained and was negative except for what was noted above.    Physical examination:    BP (!) 131/91   Pulse 79   Temp 97.5  F (36.4  C) (Temporal)   Ht 1.6 m (5' 3\")   Wt 94 kg (207 lb 3.2 oz)   BMI 36.70 kg/m       General Appearance: NAD    Skin: There are no rashes or other skin lesions.    Musculoskeletal:  There is no scoliosis, lordosis, kyphosis, pes cavus, or hammertoes.    Neurologic examination:    Mental status:  Patient is alert, attentive, and oriented x 3.  Language is coherent and fluent without aphasia.  Memory, comprehension and ability to follow commands were intact.       Cranial nerves:  Ptosis present at baseline left more than right. Better than last visit. Eye closure is strong. No diplopia. Smile is full.  Speech is moderately dysarthric.   Neck extension is probably full     Motor " exam: No atrophy or fasciculations. He has baseline left arm and leg weakness from CP. Power in the left arm and leg is probably full. He can easily rise from a seated position without assist.     Complex motor skills: No tremor or ataxia    Sensory exam: Light touch intact in upper and lower limbs    Gait: Narrow and stable, but with head drop.    Deep tendon reflexes:   Right Left   Triceps 2 2   Biceps 2 2   Brachioradialis 2 2   Knee jerk 2 3   Ankle jerk 2 3      Myasthenia gravis outcomes   MG-ADL MG-Composite MG medications   5/31/23 5 9 none   11/7/23 3 5 Pred 30 mg daily     Assessment:    Mohsen Najera is a 58 year old man with ACHr ab positive non-thymomatous generalized myasthenia gravis. This has occurred on the background of cerebral palsy with chronic dysarthria and left arm/leg weakness.Compared to when we last met MG associated symptoms are clearly improved. Hard to know if his generalized weakness and SOB is MG related, but considering improvement in ptosis and head drop I think unlikely. May be the effects of steroid associated weight gain. His home status presents some barriers for treatment, but none that are prohibitive. Will proceed as below.     Plan:      1. Immunotherapy:   - Prednisone dose 30 mg daily. Will reduce to 25 mg x 2 weeks then 20 mg x 2 weeks, then 15 mg x 4 weeks, then 10 mg daily.   - IST: May need immunotherapy above prednisone, but will hold off for now. Although data on rituximab is not strong, considering need for only q 6 month (or sometimes less) dosing it may be an attractive option to try. Daily oral MMF is another option. I am less enthusiastic about FcRn or complement inhibitors that need frequent IV or subcutaneous infusions. Will make a determination on this pending clinical course with pred taper.  2. Symptomatic therapy:   - Continue mestinon 60 mg TID. Side effects discussed.  3. SOB: Will check PFT. Might be due to weight gain? Also asked him to discuss with  PCP. It did occur after COVID over the summer  4. CT chest 6/23 showed no suspicious mass within the chest to suggest the presence of thymoma. There was a solid 4 mm pulmonary nodule at the right lower lobe and also a homogeneously enhancing 1.0 cm left adrenal nodule, presumably benign, such as adenoma.Will repeat CT in 2024 to assess for interval change.   4. Discussed potential MG triggers including heat, surgery, and illness. Certain medications and over the counter preparations may also cause worsening of MG symptoms. I advised him to tell any doctor or dentist about MG diagnosis. A list of cautionary medications can be found at [email or web address].  5. Disclosures discussed and in AVS  6. Follow up in 4 months. Sooner if needed.  ----      Again, thank you for allowing me to participate in the care of your patient.      Sincerely,    Jovany Lion MD

## 2023-11-27 DIAGNOSIS — G70.01 MYASTHENIA GRAVIS WITH EXACERBATION (H): ICD-10-CM

## 2023-11-27 RX ORDER — PREDNISONE 10 MG/1
30 TABLET ORAL DAILY
Qty: 90 TABLET | Refills: 0 | Status: SHIPPED | OUTPATIENT
Start: 2023-11-27 | End: 2023-12-15

## 2023-12-13 RX ORDER — ALBUTEROL SULFATE 0.83 MG/ML
2.5 SOLUTION RESPIRATORY (INHALATION) ONCE
Status: DISCONTINUED | OUTPATIENT
Start: 2023-12-14 | End: 2023-12-14

## 2023-12-14 ENCOUNTER — HOSPITAL ENCOUNTER (OUTPATIENT)
Dept: RESPIRATORY THERAPY | Facility: CLINIC | Age: 58
Discharge: HOME | End: 2023-12-14
Admitting: PSYCHIATRY & NEUROLOGY
Payer: MEDICARE

## 2023-12-14 DIAGNOSIS — G70.00 MYASTHENIA GRAVIS WITHOUT EXACERBATION (H): ICD-10-CM

## 2023-12-14 DIAGNOSIS — R06.02 SOB (SHORTNESS OF BREATH): Primary | ICD-10-CM

## 2023-12-14 LAB — HGB BLD-MCNC: 15 G/DL (ref 13.3–17.7)

## 2023-12-14 PROCEDURE — 36415 COLL VENOUS BLD VENIPUNCTURE: CPT | Performed by: PSYCHIATRY & NEUROLOGY

## 2023-12-14 PROCEDURE — 94726 PLETHYSMOGRAPHY LUNG VOLUMES: CPT

## 2023-12-14 PROCEDURE — 999N000157 HC STATISTIC RCP TIME EA 10 MIN

## 2023-12-14 PROCEDURE — 85018 HEMOGLOBIN: CPT | Performed by: PSYCHIATRY & NEUROLOGY

## 2023-12-14 PROCEDURE — 94060 EVALUATION OF WHEEZING: CPT

## 2023-12-14 PROCEDURE — 94729 DIFFUSING CAPACITY: CPT

## 2023-12-14 NOTE — PROGRESS NOTES
PT came into PFT lab for a complete PFT. Pt unable to do PFT maneuvers after several attempts of SVC and FVC. No results to report other than HGB was 15.0. Tried with caregiver to encourage patient to retry and  but patient did not want nose clips on and could only deep breathe fast and was very anxious. He would take nose clips off, and was tired and did not wish to try anymore. We did call ordering MD and left a message regarding test and what steps for caregiver to do next.   Yelena Echevarria, RT

## 2023-12-14 NOTE — PROGRESS NOTES
RESPIRATORY CARE NOTE    Complete Pulmonary Function Test completed by patient.  Good patient effort and cooperation. Albuterol 2.5 mg neb given for bronchodilation.  The results of this test meet the ATS standards for acceptability and repeatability. PT returned to baseline and left in no distress.    Yelena Echevarria, RT  12/14/2023

## 2023-12-15 DIAGNOSIS — G70.01 MYASTHENIA GRAVIS WITH EXACERBATION (H): ICD-10-CM

## 2023-12-15 RX ORDER — PREDNISONE 10 MG/1
TABLET ORAL
Qty: 90 TABLET | Refills: 1 | Status: SHIPPED | OUTPATIENT
Start: 2023-12-15 | End: 2024-08-01

## 2023-12-15 NOTE — TELEPHONE ENCOUNTER
predniSONE (DELTASONE) 10 MG tablet     [facility]  is requesting a new script for the medication be sent over to the pharmacy listed above. Script needs to state patients instructions on how to taper down on the medication as pharmacy will not refill with out those directions.

## 2023-12-15 NOTE — TELEPHONE ENCOUNTER
Health Call Center    Phone Message    May a detailed message be left on voicemail: yes     Reason for Call: Medication Question or concern regarding medication   Prescription Clarification  Name of Medication: predniSONE (DELTASONE) 10 MG tablet   Prescribing Provider: Dr. Lion   Pharmacy: [facility] [id number] - [place], [place] - [address]   What on the order needs clarification? [facility]  is requesting a new script for the medication be sent over to the pharmacy listed above. Script needs to state patients instructions on how to taper down on the medication as pharmacy will not refill with out those directions.       Action Taken: Message routed to:  Other: [facility]     Travel Screening: Not Applicable

## 2024-01-08 DIAGNOSIS — G70.01 MYASTHENIA GRAVIS WITH EXACERBATION (H): ICD-10-CM

## 2024-01-08 RX ORDER — PYRIDOSTIGMINE BROMIDE 60 MG/1
60 TABLET ORAL 3 TIMES DAILY
Qty: 90 TABLET | Refills: 2 | Status: SHIPPED | OUTPATIENT
Start: 2024-01-08 | End: 2024-08-01

## 2024-03-12 ENCOUNTER — OFFICE VISIT (OUTPATIENT)
Dept: NEUROLOGY | Facility: CLINIC | Age: 59
End: 2024-03-12
Payer: MEDICARE

## 2024-03-12 VITALS
HEART RATE: 89 BPM | TEMPERATURE: 97.5 F | SYSTOLIC BLOOD PRESSURE: 126 MMHG | DIASTOLIC BLOOD PRESSURE: 86 MMHG | OXYGEN SATURATION: 95 %

## 2024-03-12 DIAGNOSIS — G70.01 MYASTHENIA GRAVIS WITH EXACERBATION (H): Primary | ICD-10-CM

## 2024-03-12 RX ORDER — DICYCLOMINE HYDROCHLORIDE 10 MG/1
10 CAPSULE ORAL DAILY
COMMUNITY
Start: 2023-09-18

## 2024-03-12 NOTE — PROGRESS NOTES
"History of MG:    Mohsen \"Margarito\" Dania is a 58 year old man with ACHr ab positive generalized myasthenia gravis. He has a history of cerebral palsy. MG symptoms were first noticed in late 2022. The first symptom was left eyelid ptosis. Over the following weeks and months the right also became affected. No diplopia. In the spring 2023 he developed head drop. He has baseline slurred speech from cerebral palsy, but over the last couple months his speech worsened. No dysphagia. No limb weakness. In February 2023 he was found to have Achr ab elevations. In 6/23 he started prednisone 30 mg daily. During 11/23 visit, ptosis had normalized and head drop had improved, but unfortunately he had gained ~20 pounds. At this time we made the decision to taper his daily prednisone down to 10mg.    Interval history:  We last saw him 11/07/23. After this visit he tapered daily prednisone from 30mg down to 10mg. Today he reports ptosis which remains stable and minimal unless he is tired. He notes shortness of breath after ~10 minutes of sustained activity, which also represents stability since last visit. He was unable to complete PFTs ordered 11/07/23. His posture is slouched and he has dysarthria, but these are his baseline for CP. Head drop remains significantly improved. He denies diplopia. No difficulties swallowing. He reports his strength is similar to last visit. He notes he occasionally has unsteady balance. He described a new difficulty maintaining sustained urination past several seconds at a time.    Prior pertinent laboratory work-up:  2/6/23: ACHr ab 2.6     Prior pertinent radiology work-up:  12/22: MRI brain showed no explanation for his symptoms  6/1/23: CT chest showed no suspicious mass within the chest to suggest the presence of thymoma. There was a solid 4 mm pulmonary nodule at the right lower lobe and also a homogeneously enhancing 1.0 cm left adrenal nodule, presumably benign, such as adenoma.     Prior " electrophysiologic work-up:  None    Past Medical History:   Cerebral palsy  HTN  Myasthenia gravis     Past Surgical History:  None as an adult     Family history:    There is no known family history of hereditary neuropathies or other neuromuscular disorders.     Social History:    Rare alcohol. He denies tobacco or illicit drug use.      Medical Allergies:  No Known Allergies     Current Medications:      Current Outpatient Medications:     acetaminophen (TYLENOL) 500 MG tablet, TAKE 2 TABLETS BY MOUTH EVERY 8 HOURS PRN, Disp: , Rfl:     amLODIPine (NORVASC) 5 MG tablet, Take 5 mg by mouth daily, Disp: , Rfl:     dicyclomine (BENTYL) 10 MG capsule, Take 10 mg by mouth daily, Disp: , Rfl:     ergocalciferol (ERGOCALCIFEROL) 1.25 MG (06733 UT) capsule, TAKE 1 CAPSULE BY MOUTH WEEKLY, Disp: , Rfl:     FLUoxetine (PROZAC) 10 MG capsule, Take 10 mg by mouth daily, Disp: , Rfl:     loratadine (CLARITIN) 10 MG tablet, Take 10 mg by mouth daily, Disp: , Rfl:     omeprazole (PRILOSEC) 20 MG DR capsule, Take 20 mg by mouth daily, Disp: , Rfl:     predniSONE (DELTASONE) 10 MG tablet, From 3 tablets (30 mg) daily, reduce to 25 mg x 2 weeks then 20 mg x 2 weeks, then 15 mg x 4 weeks, then 10 mg daily., Disp: 90 tablet, Rfl: 1    pyRIDostigmine (MESTINON) 60 MG tablet, Take 1 tablet (60 mg) by mouth 3 times daily, Disp: 90 tablet, Rfl: 2    SUMAtriptan (IMITREX) 50 MG tablet, Take 50 mg by mouth daily as needed, Disp: , Rfl:     Review of Systems: A complete review of systems was obtained and was negative except for what was noted above.    Physical examination:    /86 (BP Location: Right arm, Patient Position: Sitting, Cuff Size: Adult Large)   Pulse 89   Temp 97.5  F (36.4  C) (Temporal)   SpO2 95%     General Appearance: NAD    Skin: There are no rashes or other skin lesions.    Musculoskeletal: There is no scoliosis, lordosis, kyphosis, pes cavus, or hammertoes.    Neurologic examination:    Mental status:   "Patient is alert, attentive, and oriented x 3.  Language is coherent and fluent without aphasia.  Memory, comprehension and ability to follow commands were intact.       Cranial nerves:  Ptosis present at baseline left more than right. Eye closure is strong. No diplopia. Smile is full. Speech is moderately dysarthric. Neck extension is probably full.    Motor exam: No atrophy or fasciculations. He has baseline left arm and leg weakness from CP. Power in the left arm and leg is probably full. He can rise from a seated position with assistance from his arms.     Complex motor skills: No tremor or ataxia    Sensory exam: Light touch intact in upper and lower limbs    Gait: Narrow and stable, but with head drop.    Deep tendon reflexes:    Right Left   Triceps 2 2   Biceps 2 2   Brachioradialis 2 2   Knee jerk 2 3   Ankle jerk 2 3      Myasthenia gravis outcomes    MG-ADL MG-Composite MG medications   5/31/23 5 9 none   11/7/23 3 5 Pred 30 mg daily   3/12/24 5 6 Pred 10 mg daily, Mestinon 60mg TID     Assessment:    Mohsen \"Margarito\" Dania is a 58 year old man with ACHr ab positive non-thymomatous generalized myasthenia gravis. This has occurred on the background of cerebral palsy with chronic dysarthria and left arm/leg weakness. Since our last visit, MG associated symptoms are largely stable. There are minor increases in MG outcome scores and subjective balance-related symptoms, but it is difficult to fully separate these findings from his CP. His current management appears adequate and he remains satisfied with his current condition. He isn't experiencing aggregation of dysphagia or SOB. Will proceed as below.     Plan:      1. Immunotherapy:   - Continue prednisone 10 mg daily. Would like to taper lower, but not yet.    - IST: No indication for immunotherapy above prednisone at this time  2. Symptomatic therapy:   - Increase mestinon from 60 mg TID to QID. Side effects discussed.  3. SOB: Recheck PFT (PFT testing in " 11/23 was unsuccessful). Might be due to weight gain?  4. CT chest 6/23 showed no suspicious mass within the chest to suggest the presence of thymoma. There was a solid 4 mm pulmonary nodule at the right lower lobe and also a homogeneously enhancing 1.0 cm left adrenal nodule, presumably benign, such as adenoma. Will repeat CT later in 2024 to assess for interval change.  5. Discussed potential MG triggers including heat, surgery, and illness. Certain medications and over the counter preparations may also cause worsening of MG symptoms. I advised him to tell any doctor or dentist about MG diagnosis. A list of cautionary medications can be found at [email or web address]-Drugs.  6. Follow up in 4 months. Sooner if needed.    Jesus Mejia  Medical Student    I have discussed my findings and exam with Dr. Lion. His addendum follows:  ---  ADDENDUM:  I personally interviewed and examined the patient. I agree with the history, physical, assessment, and plan as documented above. Changes to the physical examination, assessment and plan have been incorporated into the note by myself, as to make it a single cohesive document. Difficult to disentangle MG from CP, but seems that at this point MG symptoms are reasonably well controlled. Discussed risks of maximal MG control with those of immunosuppression. At this point the risks of immunotherapy escalation are likely to outweigh benefit. Will continue plan as above.            Jovany Lion MD

## 2024-03-12 NOTE — PATIENT INSTRUCTIONS
Your myasthenia gravis medications are:   Prednisone: Your current dose is 10 mg per day. Stay at this dose for now.   Mestinon 60 mg: Increase from three times per day to four times per day

## 2024-03-12 NOTE — LETTER
"3/12/2024       RE: Mohsen Najera  [address]  [place] [address]       Dear Colleague,    Thank you for referring your patient, Mohsen Najera, to the  [facility] at [facility]. Please see a copy of my visit note below.    History of MG:    Mohsen \"Margarito\" Dania is a 58 year old man with ACHr ab positive generalized myasthenia gravis. He has a history of cerebral palsy. MG symptoms were first noticed in late 2022. The first symptom was left eyelid ptosis. Over the following weeks and months the right also became affected. No diplopia. In the spring 2023 he developed head drop. He has baseline slurred speech from cerebral palsy, but over the last couple months his speech worsened. No dysphagia. No limb weakness. In February 2023 he was found to have Achr ab elevations. In 6/23 he started prednisone 30 mg daily. During 11/23 visit, ptosis had normalized and head drop had improved, but unfortunately he had gained ~20 pounds. At this time we made the decision to taper his daily prednisone down to 10mg.    Interval history:  We last saw him 11/07/23. After this visit he tapered daily prednisone from 30mg down to 10mg. Today he reports ptosis which remains stable and minimal unless he is tired. He notes shortness of breath after ~10 minutes of sustained activity, which also represents stability since last visit. He was unable to complete PFTs ordered 11/07/23. His posture is slouched and he has dysarthria, but these are his baseline for CP. Head drop remains significantly improved. He denies diplopia. No difficulties swallowing. He reports his strength is similar to last visit. He notes he occasionally has unsteady balance. He described a new difficulty maintaining sustained urination past several seconds at a time.    Prior pertinent laboratory work-up:  2/6/23: ACHr ab 2.6     Prior pertinent radiology work-up:  12/22: MRI " brain showed no explanation for his symptoms  6/1/23: CT chest showed no suspicious mass within the chest to suggest the presence of thymoma. There was a solid 4 mm pulmonary nodule at the right lower lobe and also a homogeneously enhancing 1.0 cm left adrenal nodule, presumably benign, such as adenoma.     Prior electrophysiologic work-up:  None    Past Medical History:   Cerebral palsy  HTN  Myasthenia gravis     Past Surgical History:  None as an adult     Family history:    There is no known family history of hereditary neuropathies or other neuromuscular disorders.     Social History:    Rare alcohol. He denies tobacco or illicit drug use.      Medical Allergies:  No Known Allergies     Current Medications:      Current Outpatient Medications:     acetaminophen (TYLENOL) 500 MG tablet, TAKE 2 TABLETS BY MOUTH EVERY 8 HOURS PRN, Disp: , Rfl:     amLODIPine (NORVASC) 5 MG tablet, Take 5 mg by mouth daily, Disp: , Rfl:     dicyclomine (BENTYL) 10 MG capsule, Take 10 mg by mouth daily, Disp: , Rfl:     ergocalciferol (ERGOCALCIFEROL) 1.25 MG (04616 UT) capsule, TAKE 1 CAPSULE BY MOUTH WEEKLY, Disp: , Rfl:     FLUoxetine (PROZAC) 10 MG capsule, Take 10 mg by mouth daily, Disp: , Rfl:     loratadine (CLARITIN) 10 MG tablet, Take 10 mg by mouth daily, Disp: , Rfl:     omeprazole (PRILOSEC) 20 MG DR capsule, Take 20 mg by mouth daily, Disp: , Rfl:     predniSONE (DELTASONE) 10 MG tablet, From 3 tablets (30 mg) daily, reduce to 25 mg x 2 weeks then 20 mg x 2 weeks, then 15 mg x 4 weeks, then 10 mg daily., Disp: 90 tablet, Rfl: 1    pyRIDostigmine (MESTINON) 60 MG tablet, Take 1 tablet (60 mg) by mouth 3 times daily, Disp: 90 tablet, Rfl: 2    SUMAtriptan (IMITREX) 50 MG tablet, Take 50 mg by mouth daily as needed, Disp: , Rfl:     Review of Systems: A complete review of systems was obtained and was negative except for what was noted above.    Physical examination:    /86 (BP Location: Right arm, Patient Position:  "Sitting, Cuff Size: Adult Large)   Pulse 89   Temp 97.5  F (36.4  C) (Temporal)   SpO2 95%     General Appearance: NAD    Skin: There are no rashes or other skin lesions.    Musculoskeletal: There is no scoliosis, lordosis, kyphosis, pes cavus, or hammertoes.    Neurologic examination:    Mental status:  Patient is alert, attentive, and oriented x 3.  Language is coherent and fluent without aphasia.  Memory, comprehension and ability to follow commands were intact.       Cranial nerves:  Ptosis present at baseline left more than right. Eye closure is strong. No diplopia. Smile is full. Speech is moderately dysarthric. Neck extension is probably full.    Motor exam: No atrophy or fasciculations. He has baseline left arm and leg weakness from CP. Power in the left arm and leg is probably full. He can rise from a seated position with assistance from his arms.     Complex motor skills: No tremor or ataxia    Sensory exam: Light touch intact in upper and lower limbs    Gait: Narrow and stable, but with head drop.    Deep tendon reflexes:    Right Left   Triceps 2 2   Biceps 2 2   Brachioradialis 2 2   Knee jerk 2 3   Ankle jerk 2 3      Myasthenia gravis outcomes    MG-ADL MG-Composite MG medications   5/31/23 5 9 none   11/7/23 3 5 Pred 30 mg daily   3/12/24 5 6 Pred 10 mg daily, Mestinon 60mg TID     Assessment:    Mohsen \"Margarito\" Dania is a 58 year old man with ACHr ab positive non-thymomatous generalized myasthenia gravis. This has occurred on the background of cerebral palsy with chronic dysarthria and left arm/leg weakness. Since our last visit, MG associated symptoms are largely stable. There are minor increases in MG outcome scores and subjective balance-related symptoms, but it is difficult to fully separate these findings from his CP. His current management appears adequate and he remains satisfied with his current condition. He isn't experiencing aggregation of dysphagia or SOB. Will proceed as below. "     Plan:      1. Immunotherapy:   - Continue prednisone 10 mg daily. Would like to taper lower, but not yet.    - IST: No indication for immunotherapy above prednisone at this time  2. Symptomatic therapy:   - Increase mestinon from 60 mg TID to QID. Side effects discussed.  3. SOB: Recheck PFT (PFT testing in 11/23 was unsuccessful). Might be due to weight gain?  4. CT chest 6/23 showed no suspicious mass within the chest to suggest the presence of thymoma. There was a solid 4 mm pulmonary nodule at the right lower lobe and also a homogeneously enhancing 1.0 cm left adrenal nodule, presumably benign, such as adenoma. Will repeat CT later in 2024 to assess for interval change.  5. Discussed potential MG triggers including heat, surgery, and illness. Certain medications and over the counter preparations may also cause worsening of MG symptoms. I advised him to tell any doctor or dentist about MG diagnosis. A list of cautionary medications can be found at [email or web address]-Drugs.  6. Follow up in 4 months. Sooner if needed.    Jesus Mejia  Medical Student    I have discussed my findings and exam with Dr. Lion. His addendum follows:  ---  ADDENDUM:  I personally interviewed and examined the patient. I agree with the history, physical, assessment, and plan as documented above. Changes to the physical examination, assessment and plan have been incorporated into the note by myself, as to make it a single cohesive document. Difficult to disentangle MG from CP, but seems that at this point MG symptoms are reasonably well controlled. Discussed risks of maximal MG control with those of immunosuppression. At this point the risks of immunotherapy escalation are likely to outweigh benefit. Will continue plan as above.          Again, thank you for allowing me to participate in the care of your patient.      Sincerely,    Jovany Lion MD

## 2024-04-03 ENCOUNTER — LAB REQUISITION (OUTPATIENT)
Dept: LAB | Facility: CLINIC | Age: 59
End: 2024-04-03
Payer: MEDICARE

## 2024-04-03 DIAGNOSIS — R32 UNSPECIFIED URINARY INCONTINENCE: ICD-10-CM

## 2024-04-03 PROCEDURE — 87086 URINE CULTURE/COLONY COUNT: CPT | Mod: ORL | Performed by: FAMILY MEDICINE

## 2024-04-04 LAB — BACTERIA UR CULT: NORMAL

## 2024-04-10 ENCOUNTER — LAB REQUISITION (OUTPATIENT)
Dept: LAB | Facility: CLINIC | Age: 59
End: 2024-04-10
Payer: MEDICARE

## 2024-04-10 DIAGNOSIS — I10 ESSENTIAL (PRIMARY) HYPERTENSION: ICD-10-CM

## 2024-04-10 DIAGNOSIS — E53.8 DEFICIENCY OF OTHER SPECIFIED B GROUP VITAMINS: ICD-10-CM

## 2024-04-10 DIAGNOSIS — E78.5 HYPERLIPIDEMIA, UNSPECIFIED: ICD-10-CM

## 2024-04-10 DIAGNOSIS — E55.9 VITAMIN D DEFICIENCY, UNSPECIFIED: ICD-10-CM

## 2024-04-10 DIAGNOSIS — E07.9 DISORDER OF THYROID, UNSPECIFIED: ICD-10-CM

## 2024-04-11 LAB
ALBUMIN SERPL BCG-MCNC: 4 G/DL (ref 3.5–5.2)
ALP SERPL-CCNC: 80 U/L (ref 40–150)
ALT SERPL W P-5'-P-CCNC: 28 U/L (ref 0–70)
ANION GAP SERPL CALCULATED.3IONS-SCNC: 11 MMOL/L (ref 7–15)
AST SERPL W P-5'-P-CCNC: 26 U/L (ref 0–45)
BILIRUB SERPL-MCNC: 0.3 MG/DL
BUN SERPL-MCNC: 10.9 MG/DL (ref 6–20)
CALCIUM SERPL-MCNC: 9.2 MG/DL (ref 8.6–10)
CHLORIDE SERPL-SCNC: 107 MMOL/L (ref 98–107)
CHOLEST SERPL-MCNC: 158 MG/DL
CREAT SERPL-MCNC: 1.03 MG/DL (ref 0.67–1.17)
DEPRECATED HCO3 PLAS-SCNC: 21 MMOL/L (ref 22–29)
EGFRCR SERPLBLD CKD-EPI 2021: 85 ML/MIN/1.73M2
ERYTHROCYTE [DISTWIDTH] IN BLOOD BY AUTOMATED COUNT: 12.8 % (ref 10–15)
FASTING STATUS PATIENT QL REPORTED: NO
FOLATE SERPL-MCNC: 10.3 NG/ML (ref 4.6–34.8)
GLUCOSE SERPL-MCNC: 112 MG/DL (ref 70–99)
HCT VFR BLD AUTO: 42.6 % (ref 40–53)
HDLC SERPL-MCNC: 30 MG/DL
HGB BLD-MCNC: 13.9 G/DL (ref 13.3–17.7)
LDLC SERPL CALC-MCNC: 101 MG/DL
MCH RBC QN AUTO: 29.3 PG (ref 26.5–33)
MCHC RBC AUTO-ENTMCNC: 32.6 G/DL (ref 31.5–36.5)
MCV RBC AUTO: 90 FL (ref 78–100)
NONHDLC SERPL-MCNC: 128 MG/DL
PLATELET # BLD AUTO: 296 10E3/UL (ref 150–450)
POTASSIUM SERPL-SCNC: 4 MMOL/L (ref 3.4–5.3)
PROT SERPL-MCNC: 6.4 G/DL (ref 6.4–8.3)
RBC # BLD AUTO: 4.75 10E6/UL (ref 4.4–5.9)
SODIUM SERPL-SCNC: 139 MMOL/L (ref 135–145)
TRIGL SERPL-MCNC: 134 MG/DL
TSH SERPL DL<=0.005 MIU/L-ACNC: 0.76 UIU/ML (ref 0.3–4.2)
VIT B12 SERPL-MCNC: 714 PG/ML (ref 232–1245)
VIT D+METAB SERPL-MCNC: 53 NG/ML (ref 20–50)
WBC # BLD AUTO: 4.8 10E3/UL (ref 4–11)

## 2024-04-11 PROCEDURE — 84443 ASSAY THYROID STIM HORMONE: CPT | Mod: ORL | Performed by: FAMILY MEDICINE

## 2024-04-11 PROCEDURE — P9604 ONE-WAY ALLOW PRORATED TRIP: HCPCS | Mod: ORL | Performed by: FAMILY MEDICINE

## 2024-04-11 PROCEDURE — 82607 VITAMIN B-12: CPT | Mod: ORL | Performed by: FAMILY MEDICINE

## 2024-04-11 PROCEDURE — 85027 COMPLETE CBC AUTOMATED: CPT | Mod: ORL | Performed by: FAMILY MEDICINE

## 2024-04-11 PROCEDURE — 82746 ASSAY OF FOLIC ACID SERUM: CPT | Mod: ORL | Performed by: FAMILY MEDICINE

## 2024-04-11 PROCEDURE — 80053 COMPREHEN METABOLIC PANEL: CPT | Mod: ORL | Performed by: FAMILY MEDICINE

## 2024-04-11 PROCEDURE — 36415 COLL VENOUS BLD VENIPUNCTURE: CPT | Mod: ORL | Performed by: FAMILY MEDICINE

## 2024-04-11 PROCEDURE — 80061 LIPID PANEL: CPT | Mod: ORL | Performed by: FAMILY MEDICINE

## 2024-04-11 PROCEDURE — 82306 VITAMIN D 25 HYDROXY: CPT | Mod: ORL | Performed by: FAMILY MEDICINE

## 2024-08-01 DIAGNOSIS — G70.01 MYASTHENIA GRAVIS WITH EXACERBATION (H): ICD-10-CM

## 2024-08-01 NOTE — TELEPHONE ENCOUNTER
Called nurse line at [facility] (phone [phone]). Denise, Margarito's nurse, states that Margarito is reporting increased difficulty speaking.  He denies shortness of breath or trouble swallowing. She checked his weight and it is stable. Denise is unsure if his head drop of worse. Denise confirmed that Margarito IS taking mestinon TID but NOT taking any prednisone. Will notify Dr. Lion.    Rima Pastor RN

## 2024-08-01 NOTE — TELEPHONE ENCOUNTER
Received a message from Margarito Pan's , that he needs his medications sent to his new assisted living facility. Prednisone and mestinon pended to Dr. Lion.    Rima Pastor RN

## 2024-08-01 NOTE — TELEPHONE ENCOUNTER
[facility] Call Center    Phone Message    May a detailed message be left on voicemail: yes     Reason for Call: Other:       Viviane, , states that over the past few days he is having a hard time sopeaking and a hard time swallowing. Rapidly getting worse. Requesting call back for guidance, call back #[phone]    Also updating pharmacy info:   [facility]   [address] [place] [address]  Phone #[phone]  Fax #[phone]      Action Taken: Message routed to:  [facility] Neurology    Travel Screening: Not Applicable

## 2024-08-01 NOTE — TELEPHONE ENCOUNTER
[facility] Call Center    Phone Message    May a detailed message be left on voicemail: yes     Reason for Call: Viviane is requesting that Rima send new orders to Pt;s new pharmacy as Pt is now in an assisted living facility. Please send Pt's medications over to the pharmacy stated below. Viviane just had the name and phone number ( no location)     [facility]    Phone: [phone]    Assisted living facility is [facility]  Phone [phone]  Lead nurse is named Vivian [facility] [phone]    Viviane is requesting that Rima contact them directly as well     Action Taken: Message routed to:  [facility] Neurology     Travel Screening: Not Applicable     Date of Service:

## 2024-08-03 RX ORDER — PYRIDOSTIGMINE BROMIDE 60 MG/1
60 TABLET ORAL 4 TIMES DAILY
Qty: 120 TABLET | Refills: 3 | Status: SHIPPED | OUTPATIENT
Start: 2024-08-03

## 2024-08-03 RX ORDER — PREDNISONE 10 MG/1
10 TABLET ORAL DAILY
Qty: 90 TABLET | Refills: 3 | Status: SHIPPED | OUTPATIENT
Start: 2024-08-03

## 2024-08-13 ENCOUNTER — OFFICE VISIT (OUTPATIENT)
Dept: NEUROLOGY | Facility: CLINIC | Age: 59
End: 2024-08-13
Payer: MEDICARE

## 2024-08-13 VITALS
HEART RATE: 77 BPM | WEIGHT: 170.6 LBS | HEIGHT: 63 IN | SYSTOLIC BLOOD PRESSURE: 117 MMHG | DIASTOLIC BLOOD PRESSURE: 76 MMHG | BODY MASS INDEX: 30.23 KG/M2 | TEMPERATURE: 98 F | OXYGEN SATURATION: 96 %

## 2024-08-13 DIAGNOSIS — G70.01 MYASTHENIA GRAVIS WITH EXACERBATION (H): Primary | ICD-10-CM

## 2024-08-13 RX ORDER — HYDROXYZINE HYDROCHLORIDE 25 MG/1
25 TABLET, FILM COATED ORAL
COMMUNITY

## 2024-08-13 RX ORDER — NYSTATIN 100000 [USP'U]/G
POWDER TOPICAL
COMMUNITY
Start: 2024-08-06

## 2024-08-13 NOTE — LETTER
"8/13/2024       RE: Mohsen Najera  [address]  [place] [address]     Dear Colleague,    Thank you for referring your patient, Mohsen Najera, to the  [facility] at [facility]. Please see a copy of my visit note below.    History of MG:    Mohsen \"Margarito\" Dania is a 59 year old man with ACHr ab positive generalized myasthenia gravis. He has a history of cerebral palsy. MG symptoms were first noticed in late 2022. The first symptom was left eyelid ptosis. Over the following weeks and months the right also became affected. No diplopia. In the spring 2023 he developed head drop. He has baseline slurred speech from cerebral palsy, but over the last couple months his speech worsened. No dysphagia. No limb weakness. In February 2023 he was found to have Achr ab elevations. In 6/23 he started prednisone 30 mg daily. During 11/23 visit, ptosis had normalized and head drop had improved, but unfortunately he had gained ~20 pounds. At this time we made the decision to taper his daily prednisone down to 10mg.    Interval history:  We last saw him 3/12/24. He was in [facility] for the last week, discharged just yesterday. He developed difficulty swallowing and slurred speech. The understanding after I last saw him was that he was to stay on prednisone, but his home weaned him off prednisone in May. Last week he restarted prednisone 40 mg at [facility] and was treated with IVIG. He has been weaned to pred 30 mg daily. He feels better now than before his hospitalization.  He is eating al nutrition by mouth, but is on a pureed diet. He has not had a swallow study. He is having speech therapy start next week. He has not had diplopia. He does have ptosis, but this is stable from prior visits.     Prior pertinent laboratory work-up:  2/6/23: ACHr ab 2.6     Prior pertinent radiology work-up:  12/22: MRI brain showed no explanation " for his symptoms  6/1/23: CT chest showed no suspicious mass within the chest to suggest the presence of thymoma. There was a solid 4 mm pulmonary nodule at the right lower lobe and also a homogeneously enhancing 1.0 cm left adrenal nodule, presumably benign, such as adenoma.     Prior electrophysiologic work-up:  None    Past Medical History:   Cerebral palsy  HTN  Myasthenia gravis     Past Surgical History:  None as an adult     Family history:    There is no known family history of hereditary neuropathies or other neuromuscular disorders.     Social History:    Rare alcohol. He denies tobacco or illicit drug use.      Medical Allergies:  No Known Allergies     Current Medications:    Current Outpatient Medications:      acetaminophen (TYLENOL) 500 MG tablet, TAKE 2 TABLETS BY MOUTH EVERY 8 HOURS PRN, Disp: , Rfl:      amLODIPine (NORVASC) 5 MG tablet, Take 5 mg by mouth daily, Disp: , Rfl:      dicyclomine (BENTYL) 10 MG capsule, Take 10 mg by mouth daily, Disp: , Rfl:      FLUoxetine (PROZAC) 10 MG capsule, Take 10 mg by mouth daily, Disp: , Rfl:      FLUoxetine (PROZAC) 20 MG capsule, Take 20 mg by mouth daily, Disp: , Rfl:      hydrOXYzine HCl (ATARAX) 25 MG tablet, Take 25 mg by mouth, Disp: , Rfl:      loratadine (CLARITIN) 10 MG tablet, Take 10 mg by mouth daily, Disp: , Rfl:      nystatin (MYCOSTATIN) 023825 UNIT/GM external powder, Apply topically two times a day. Indications: Skin Infection due to Candida Yeast, Disp: , Rfl:      omeprazole (PRILOSEC) 20 MG DR capsule, Take 20 mg by mouth daily, Disp: , Rfl:      predniSONE (DELTASONE) 10 MG tablet, Take 1 tablet (10 mg) by mouth daily, Disp: 90 tablet, Rfl: 3     pyRIDostigmine (MESTINON) 60 MG tablet, Take 1 tablet (60 mg) by mouth 4 times daily (Patient taking differently: Take 60 mg by mouth 3 times daily), Disp: 120 tablet, Rfl: 3     ergocalciferol (ERGOCALCIFEROL) 1.25 MG (75828 UT) capsule, TAKE 1 CAPSULE BY MOUTH WEEKLY (Patient not taking:  "Reported on 8/13/2024), Disp: , Rfl:      SUMAtriptan (IMITREX) 50 MG tablet, Take 50 mg by mouth daily as needed (Patient not taking: Reported on 8/13/2024), Disp: , Rfl:     Review of Systems: A complete review of systems was obtained and was negative except for what was noted above.    Physical examination:    /76   Pulse 77   Temp 98  F (36.7  C) (Temporal)   Ht 1.6 m (5' 3\")   Wt 77.4 kg (170 lb 9.6 oz)   SpO2 96%   BMI 30.22 kg/m      General Appearance: NAD    Skin: There are no rashes or other skin lesions.    Musculoskeletal: There is no scoliosis, lordosis, kyphosis, pes cavus, or hammertoes.    Neurologic examination:    Mental status:  Patient is alert, attentive, and oriented x 3.  Language is coherent and fluent without aphasia.  Memory, comprehension and ability to follow commands were intact.       Cranial nerves:  Ptosis present at baseline right more than left. Eye closure is strong. No diplopia. Smile full. Speech is dysarthric, similar to his last visit. Neck extension is probably ok.    Motor exam: No atrophy or fasciculations. He has baseline left arm and leg weakness from CP. Power in the left arm and leg is probably full. He can rise from a seated position with assistance from his arms.     Complex motor skills: No tremor or ataxia    Sensory exam: Light touch intact in upper and lower limbs    Gait: Narrow and stable, but with head drop.    Deep tendon reflexes:    Right Left   Triceps 2 2   Biceps 2 2   Brachioradialis 2 2   Knee jerk 2 3   Ankle jerk 2 3      Assessment:    Mohsen \"Margarito\" Dania is a 59 year old man with ACHr ab positive non-thymomatous generalized myasthenia gravis. This has occurred on the background of cerebral palsy with chronic dysarthria and left arm/leg weakness. Unfortunately he had a relapse since his last visit, which seems to have been triggered by a miscommunication in his prednisone dosing. At this point will slowly wean prednisone as below. It " seems unlikely that we will be able to go below 10 mg per day. I am concerned about the added risk of immunosuppression in this man. If prednisone is not adequate then adding IVIG may be the best option for him. All questions answered.     Plan:      1. Immunotherapy:   - Current dose 30 mg daily. Reduce to 25 mg on 9/1, then 20 mg on 9/15, then 17.5 mg on 10/1, then 15 mg on 10/15, then 12.5 mg on 11/1, then 10 mg on 11/15. Stay on 10 mg thereafter until we meet next.    - IST: No indication for immunotherapy above prednisone at this time  - If worsens during pred taper then anticipate adding IVIG  2. Symptomatic therapy:   - Continue mestinon 60 mg TID   3. Swallow study next week. Increase diet per recommendations of swallow therapy.  4. SOB: No SOB at this time. May repeat PFT (PFT testing in 11/23 was unsuccessful) if needed.  5. CT chest 6/23 showed no suspicious mass within the chest to suggest the presence of thymoma. There was a solid 4 mm pulmonary nodule at the right lower lobe and also a homogeneously enhancing 1.0 cm left adrenal nodule, presumably benign, such as adenoma. Will repeat CT later in 2024 to assess for interval change.  6. Discussed potential MG triggers including heat, surgery, and illness. Certain medications and over the counter preparations may also cause worsening of MG symptoms. I advised him to tell any doctor or dentist about MG diagnosis. A list of cautionary medications can be found at [email or web address]-Drugs.  7. Follow up in 4 months. Sooner if needed.    ---      Again, thank you for allowing me to participate in the care of your patient.      Sincerely,    Jovany Lion MD

## 2024-08-13 NOTE — PATIENT INSTRUCTIONS
Prednisone:   - On 9/1/24 reduce to 25 mg daily  - On 9/15/24 reduce to 20 mg  daily  - On 10/1/24 reduce to 17.5 mg daily  - On 10/15/24 reduce to 15 mg  daily  - On 11/1/24 reduce to 12.5 mg daily  - On 11/15/24 reduce to 10 mg  daily    Stay on 10 mg thereafter until we meet next.

## 2024-08-13 NOTE — PROGRESS NOTES
"History of MG:    Mohsen \"Margarito\" Dania is a 59 year old man with ACHr ab positive generalized myasthenia gravis. He has a history of cerebral palsy. MG symptoms were first noticed in late 2022. The first symptom was left eyelid ptosis. Over the following weeks and months the right also became affected. No diplopia. In the spring 2023 he developed head drop. He has baseline slurred speech from cerebral palsy, but over the last couple months his speech worsened. No dysphagia. No limb weakness. In February 2023 he was found to have Achr ab elevations. In 6/23 he started prednisone 30 mg daily. During 11/23 visit, ptosis had normalized and head drop had improved, but unfortunately he had gained ~20 pounds. At this time we made the decision to taper his daily prednisone down to 10mg.    Interval history:  We last saw him 3/12/24. He was in [facility] for the last week, discharged just yesterday. He developed difficulty swallowing and slurred speech. The understanding after I last saw him was that he was to stay on prednisone, but his home weaned him off prednisone in May. Last week he restarted prednisone 40 mg at [facility] and was treated with IVIG. He has been weaned to pred 30 mg daily. He feels better now than before his hospitalization.  He is eating al nutrition by mouth, but is on a pureed diet. He has not had a swallow study. He is having speech therapy start next week. He has not had diplopia. He does have ptosis, but this is stable from prior visits.     Prior pertinent laboratory work-up:  2/6/23: ACHr ab 2.6     Prior pertinent radiology work-up:  12/22: MRI brain showed no explanation for his symptoms  6/1/23: CT chest showed no suspicious mass within the chest to suggest the presence of thymoma. There was a solid 4 mm pulmonary nodule at the right lower lobe and also a homogeneously enhancing 1.0 cm left adrenal nodule, presumably benign, such as adenoma.     Prior electrophysiologic " work-up:  None    Past Medical History:   Cerebral palsy  HTN  Myasthenia gravis     Past Surgical History:  None as an adult     Family history:    There is no known family history of hereditary neuropathies or other neuromuscular disorders.     Social History:    Rare alcohol. He denies tobacco or illicit drug use.      Medical Allergies:  No Known Allergies     Current Medications:    Current Outpatient Medications:     acetaminophen (TYLENOL) 500 MG tablet, TAKE 2 TABLETS BY MOUTH EVERY 8 HOURS PRN, Disp: , Rfl:     amLODIPine (NORVASC) 5 MG tablet, Take 5 mg by mouth daily, Disp: , Rfl:     dicyclomine (BENTYL) 10 MG capsule, Take 10 mg by mouth daily, Disp: , Rfl:     FLUoxetine (PROZAC) 10 MG capsule, Take 10 mg by mouth daily, Disp: , Rfl:     FLUoxetine (PROZAC) 20 MG capsule, Take 20 mg by mouth daily, Disp: , Rfl:     hydrOXYzine HCl (ATARAX) 25 MG tablet, Take 25 mg by mouth, Disp: , Rfl:     loratadine (CLARITIN) 10 MG tablet, Take 10 mg by mouth daily, Disp: , Rfl:     nystatin (MYCOSTATIN) 193472 UNIT/GM external powder, Apply topically two times a day. Indications: Skin Infection due to Candida Yeast, Disp: , Rfl:     omeprazole (PRILOSEC) 20 MG DR capsule, Take 20 mg by mouth daily, Disp: , Rfl:     predniSONE (DELTASONE) 10 MG tablet, Take 1 tablet (10 mg) by mouth daily, Disp: 90 tablet, Rfl: 3    pyRIDostigmine (MESTINON) 60 MG tablet, Take 1 tablet (60 mg) by mouth 4 times daily (Patient taking differently: Take 60 mg by mouth 3 times daily), Disp: 120 tablet, Rfl: 3    ergocalciferol (ERGOCALCIFEROL) 1.25 MG (39094 UT) capsule, TAKE 1 CAPSULE BY MOUTH WEEKLY (Patient not taking: Reported on 8/13/2024), Disp: , Rfl:     SUMAtriptan (IMITREX) 50 MG tablet, Take 50 mg by mouth daily as needed (Patient not taking: Reported on 8/13/2024), Disp: , Rfl:     Review of Systems: A complete review of systems was obtained and was negative except for what was noted above.    Physical examination:    BP  "117/76   Pulse 77   Temp 98  F (36.7  C) (Temporal)   Ht 1.6 m (5' 3\")   Wt 77.4 kg (170 lb 9.6 oz)   SpO2 96%   BMI 30.22 kg/m      General Appearance: NAD    Skin: There are no rashes or other skin lesions.    Musculoskeletal: There is no scoliosis, lordosis, kyphosis, pes cavus, or hammertoes.    Neurologic examination:    Mental status:  Patient is alert, attentive, and oriented x 3.  Language is coherent and fluent without aphasia.  Memory, comprehension and ability to follow commands were intact.       Cranial nerves:  Ptosis present at baseline right more than left. Eye closure is strong. No diplopia. Smile full. Speech is dysarthric, similar to his last visit. Neck extension is probably ok.    Motor exam: No atrophy or fasciculations. He has baseline left arm and leg weakness from CP. Power in the left arm and leg is probably full. He can rise from a seated position with assistance from his arms.     Complex motor skills: No tremor or ataxia    Sensory exam: Light touch intact in upper and lower limbs    Gait: Narrow and stable, but with head drop.    Deep tendon reflexes:    Right Left   Triceps 2 2   Biceps 2 2   Brachioradialis 2 2   Knee jerk 2 3   Ankle jerk 2 3      Assessment:    Mohsen \"Margarito\" Dania is a 59 year old man with ACHr ab positive non-thymomatous generalized myasthenia gravis. This has occurred on the background of cerebral palsy with chronic dysarthria and left arm/leg weakness. Unfortunately he had a relapse since his last visit, which seems to have been triggered by a miscommunication in his prednisone dosing. At this point will slowly wean prednisone as below. It seems unlikely that we will be able to go below 10 mg per day. I am concerned about the added risk of immunosuppression in this man. If prednisone is not adequate then adding IVIG may be the best option for him. All questions answered.     Plan:      1. Immunotherapy:   - Current dose 30 mg daily. Reduce to 25 mg on 9/1, " then 20 mg on 9/15, then 17.5 mg on 10/1, then 15 mg on 10/15, then 12.5 mg on 11/1, then 10 mg on 11/15. Stay on 10 mg thereafter until we meet next.    - IST: No indication for immunotherapy above prednisone at this time  - If worsens during pred taper then anticipate adding IVIG  2. Symptomatic therapy:   - Continue mestinon 60 mg TID   3. Swallow study next week. Increase diet per recommendations of swallow therapy.  4. SOB: No SOB at this time. May repeat PFT (PFT testing in 11/23 was unsuccessful) if needed.  5. CT chest 6/23 showed no suspicious mass within the chest to suggest the presence of thymoma. There was a solid 4 mm pulmonary nodule at the right lower lobe and also a homogeneously enhancing 1.0 cm left adrenal nodule, presumably benign, such as adenoma. Will repeat CT later in 2024 to assess for interval change.  6. Discussed potential MG triggers including heat, surgery, and illness. Certain medications and over the counter preparations may also cause worsening of MG symptoms. I advised him to tell any doctor or dentist about MG diagnosis. A list of cautionary medications can be found at [email or web address].  7. Follow up in 4 months. Sooner if needed.    ---

## 2024-10-16 ENCOUNTER — LAB REQUISITION (OUTPATIENT)
Dept: LAB | Facility: CLINIC | Age: 59
End: 2024-10-16
Payer: MEDICARE

## 2024-10-16 DIAGNOSIS — E53.8 DEFICIENCY OF OTHER SPECIFIED B GROUP VITAMINS: ICD-10-CM

## 2024-10-16 DIAGNOSIS — E11.9 TYPE 2 DIABETES MELLITUS WITHOUT COMPLICATIONS (H): ICD-10-CM

## 2024-10-16 DIAGNOSIS — E07.9 DISORDER OF THYROID, UNSPECIFIED: ICD-10-CM

## 2024-10-16 DIAGNOSIS — I10 ESSENTIAL (PRIMARY) HYPERTENSION: ICD-10-CM

## 2024-10-16 DIAGNOSIS — E55.9 VITAMIN D DEFICIENCY, UNSPECIFIED: ICD-10-CM

## 2024-10-17 LAB
ERYTHROCYTE [DISTWIDTH] IN BLOOD BY AUTOMATED COUNT: 13.1 % (ref 10–15)
EST. AVERAGE GLUCOSE BLD GHB EST-MCNC: 114 MG/DL
FOLATE SERPL-MCNC: 10.4 NG/ML (ref 4.6–34.8)
GLUCOSE SERPL-MCNC: 143 MG/DL (ref 70–99)
HBA1C MFR BLD: 5.6 %
HCT VFR BLD AUTO: 47.3 % (ref 40–53)
HGB BLD-MCNC: 15.2 G/DL (ref 13.3–17.7)
MCH RBC QN AUTO: 30.2 PG (ref 26.5–33)
MCHC RBC AUTO-ENTMCNC: 32.1 G/DL (ref 31.5–36.5)
MCV RBC AUTO: 94 FL (ref 78–100)
PLATELET # BLD AUTO: 307 10E3/UL (ref 150–450)
RBC # BLD AUTO: 5.04 10E6/UL (ref 4.4–5.9)
WBC # BLD AUTO: 9.3 10E3/UL (ref 4–11)

## 2024-10-17 PROCEDURE — 82306 VITAMIN D 25 HYDROXY: CPT | Mod: ORL | Performed by: FAMILY MEDICINE

## 2024-10-17 PROCEDURE — 36415 COLL VENOUS BLD VENIPUNCTURE: CPT | Mod: ORL | Performed by: FAMILY MEDICINE

## 2024-10-17 PROCEDURE — 82607 VITAMIN B-12: CPT | Mod: ORL | Performed by: FAMILY MEDICINE

## 2024-10-17 PROCEDURE — P9604 ONE-WAY ALLOW PRORATED TRIP: HCPCS | Mod: ORL | Performed by: FAMILY MEDICINE

## 2024-10-17 PROCEDURE — 85027 COMPLETE CBC AUTOMATED: CPT | Mod: ORL | Performed by: FAMILY MEDICINE

## 2024-10-17 PROCEDURE — 80053 COMPREHEN METABOLIC PANEL: CPT | Mod: ORL | Performed by: FAMILY MEDICINE

## 2024-10-17 PROCEDURE — 84443 ASSAY THYROID STIM HORMONE: CPT | Mod: ORL | Performed by: FAMILY MEDICINE

## 2024-10-17 PROCEDURE — 83036 HEMOGLOBIN GLYCOSYLATED A1C: CPT | Mod: ORL | Performed by: FAMILY MEDICINE

## 2024-10-17 PROCEDURE — 82746 ASSAY OF FOLIC ACID SERUM: CPT | Mod: ORL | Performed by: FAMILY MEDICINE

## 2024-10-18 LAB
ALBUMIN SERPL BCG-MCNC: 4 G/DL (ref 3.5–5.2)
ALP SERPL-CCNC: 73 U/L (ref 40–150)
ALT SERPL W P-5'-P-CCNC: 23 U/L (ref 0–70)
ANION GAP SERPL CALCULATED.3IONS-SCNC: 13 MMOL/L (ref 7–15)
AST SERPL W P-5'-P-CCNC: 14 U/L (ref 0–45)
BILIRUB SERPL-MCNC: 0.2 MG/DL
BUN SERPL-MCNC: 15.7 MG/DL (ref 6–20)
CALCIUM SERPL-MCNC: 9.5 MG/DL (ref 8.8–10.4)
CHLORIDE SERPL-SCNC: 106 MMOL/L (ref 98–107)
CREAT SERPL-MCNC: 1.03 MG/DL (ref 0.67–1.17)
EGFRCR SERPLBLD CKD-EPI 2021: 85 ML/MIN/1.73M2
HCO3 SERPL-SCNC: 24 MMOL/L (ref 22–29)
POTASSIUM SERPL-SCNC: 4.1 MMOL/L (ref 3.4–5.3)
PROT SERPL-MCNC: 6.7 G/DL (ref 6.4–8.3)
SODIUM SERPL-SCNC: 143 MMOL/L (ref 135–145)
TSH SERPL DL<=0.005 MIU/L-ACNC: 0.42 UIU/ML (ref 0.3–4.2)
VIT B12 SERPL-MCNC: 541 PG/ML (ref 232–1245)
VIT D+METAB SERPL-MCNC: 23 NG/ML (ref 20–50)

## 2025-01-14 ENCOUNTER — TELEPHONE (OUTPATIENT)
Dept: NEUROLOGY | Facility: CLINIC | Age: 60
End: 2025-01-14
Payer: MEDICARE

## 2025-01-14 ENCOUNTER — ENROLLMENT (OUTPATIENT)
Dept: HOME HEALTH SERVICES | Facility: HOME HEALTH | Age: 60
End: 2025-01-14
Payer: MEDICARE

## 2025-01-14 ENCOUNTER — VIRTUAL VISIT (OUTPATIENT)
Dept: NEUROLOGY | Facility: CLINIC | Age: 60
End: 2025-01-14
Payer: MEDICARE

## 2025-01-14 DIAGNOSIS — G70.00 MYASTHENIA GRAVIS WITHOUT EXACERBATION (H): ICD-10-CM

## 2025-01-14 DIAGNOSIS — R91.8 ABNORMAL CT LUNG SCREENING: Primary | ICD-10-CM

## 2025-01-14 DIAGNOSIS — G70.00 MYASTHENIA GRAVIS WITHOUT EXACERBATION (H): Primary | ICD-10-CM

## 2025-01-14 RX ORDER — HEPARIN SODIUM (PORCINE) LOCK FLUSH IV SOLN 100 UNIT/ML 100 UNIT/ML
5 SOLUTION INTRAVENOUS
OUTPATIENT
Start: 2025-01-14

## 2025-01-14 RX ORDER — DIPHENHYDRAMINE HCL 25 MG
50 CAPSULE ORAL ONCE
OUTPATIENT
Start: 2025-01-14

## 2025-01-14 RX ORDER — DIPHENHYDRAMINE HYDROCHLORIDE 50 MG/ML
50 INJECTION INTRAMUSCULAR; INTRAVENOUS
Start: 2025-01-14

## 2025-01-14 RX ORDER — MEPERIDINE HYDROCHLORIDE 25 MG/ML
25 INJECTION INTRAMUSCULAR; INTRAVENOUS; SUBCUTANEOUS
OUTPATIENT
Start: 2025-01-14

## 2025-01-14 RX ORDER — ALBUTEROL SULFATE 0.83 MG/ML
2.5 SOLUTION RESPIRATORY (INHALATION)
OUTPATIENT
Start: 2025-01-14

## 2025-01-14 RX ORDER — EPINEPHRINE 1 MG/ML
0.3 INJECTION, SOLUTION, CONCENTRATE INTRAVENOUS EVERY 5 MIN PRN
OUTPATIENT
Start: 2025-01-14

## 2025-01-14 RX ORDER — ALBUTEROL SULFATE 90 UG/1
1-2 INHALANT RESPIRATORY (INHALATION)
Start: 2025-01-14

## 2025-01-14 RX ORDER — ACETAMINOPHEN 325 MG/1
650 TABLET ORAL ONCE
Start: 2025-01-14

## 2025-01-14 RX ORDER — HEPARIN SODIUM,PORCINE 10 UNIT/ML
5-20 VIAL (ML) INTRAVENOUS DAILY PRN
OUTPATIENT
Start: 2025-01-14

## 2025-01-14 RX ORDER — DIPHENHYDRAMINE HYDROCHLORIDE 50 MG/ML
25 INJECTION INTRAMUSCULAR; INTRAVENOUS
Start: 2025-01-14

## 2025-01-14 RX ORDER — HYDROCORTISONE SODIUM SUCCINATE 100 MG/2ML
100 INJECTION INTRAMUSCULAR; INTRAVENOUS ONCE
OUTPATIENT
Start: 2025-01-14

## 2025-01-14 NOTE — NURSING NOTE
Is the patient currently in the state of [place]? YES    Visit mode:VIDEO    If the visit is dropped, the patient can be reconnected by: VIDEO VISIT: Send to e-mail at: [email or web address]    Will anyone else be joining the visit? YES: How would they like to receive their invitation?       How would you like to obtain your AVS? Mail a copy    Are changes needed to the allergy or medication list? YES: Patient is having difficulties obtaining prednisone     Reason for visit: Follow up

## 2025-01-14 NOTE — PROGRESS NOTES
"History of MG:    Mohsen \"Margarito\" Dania is a 59 year old man with ACHr ab positive generalized myasthenia gravis. He has a history of cerebral palsy. MG symptoms were first noticed in late 2022. The first symptom was left eyelid ptosis. Over the following weeks and months the right also became affected. No diplopia. In the spring 2023 he developed head drop. He has baseline slurred speech from cerebral palsy, but over the last couple months his speech worsened. No dysphagia. No limb weakness. In February 2023 he was found to have Achr ab elevations. In 6/23 he started prednisone 30 mg daily. During 11/23 visit, ptosis had normalized and head drop had improved, but unfortunately he had gained ~20 pounds. At this time we made the decision to taper his daily prednisone down to 10mg.    Interval history:  We last saw him 8/13/24. He continues to struggle with difficulty swallowing and slurred speech. He sometimes chokes on food, but not every day. He has slurred speech, but probably at his baseline for his cerebral palsy. He seems to be having more head drop and eyelid drooping. No shortness of breath. He is able to walk at his baseline. There is some confusion about his prednisone. He was supposed to be on 10 mg, but it seems that he is no longer taking it.     Prior pertinent laboratory work-up:  2/6/23: ACHr ab 2.6     Prior pertinent radiology work-up:  12/22: MRI brain showed no explanation for his symptoms  6/1/23: CT chest showed no suspicious mass within the chest to suggest the presence of thymoma. There was a solid 4 mm pulmonary nodule at the right lower lobe and also a homogeneously enhancing 1.0 cm left adrenal nodule, presumably benign, such as adenoma.     Prior electrophysiologic work-up:  None    Past Medical History:   Cerebral palsy  HTN  Myasthenia gravis     Past Surgical History:  None as an adult     Family history:    There is no known family history of hereditary neuropathies or other " neuromuscular disorders.     Social History:    Rare alcohol. He denies tobacco or illicit drug use.      Medical Allergies:  No Known Allergies     Current Medications:    Current Outpatient Medications:     acetaminophen (TYLENOL) 500 MG tablet, TAKE 2 TABLETS BY MOUTH EVERY 8 HOURS PRN, Disp: , Rfl:     amLODIPine (NORVASC) 5 MG tablet, Take 5 mg by mouth daily, Disp: , Rfl:     dicyclomine (BENTYL) 10 MG capsule, Take 10 mg by mouth daily, Disp: , Rfl:     FLUoxetine (PROZAC) 10 MG capsule, Take 10 mg by mouth daily, Disp: , Rfl:     FLUoxetine (PROZAC) 20 MG capsule, Take 20 mg by mouth daily, Disp: , Rfl:     hydrOXYzine HCl (ATARAX) 25 MG tablet, Take 25 mg by mouth, Disp: , Rfl:     loratadine (CLARITIN) 10 MG tablet, Take 10 mg by mouth daily, Disp: , Rfl:     nystatin (MYCOSTATIN) 439128 UNIT/GM external powder, Apply topically two times a day. Indications: Skin Infection due to Candida Yeast, Disp: , Rfl:     omeprazole (PRILOSEC) 20 MG DR capsule, Take 20 mg by mouth daily, Disp: , Rfl:     pyRIDostigmine (MESTINON) 60 MG tablet, Take 1 tablet (60 mg) by mouth 4 times daily (Patient taking differently: Take 60 mg by mouth 3 times daily.), Disp: 120 tablet, Rfl: 3    ergocalciferol (ERGOCALCIFEROL) 1.25 MG (03154 UT) capsule, TAKE 1 CAPSULE BY MOUTH WEEKLY (Patient not taking: Reported on 1/14/2025), Disp: , Rfl:     predniSONE (DELTASONE) 10 MG tablet, Take 1 tablet (10 mg) by mouth daily, Disp: 90 tablet, Rfl: 3    SUMAtriptan (IMITREX) 50 MG tablet, Take 50 mg by mouth daily as needed (Patient not taking: Reported on 1/14/2025), Disp: , Rfl:     Review of Systems: A complete review of systems was obtained and was negative except for what was noted above.    Physical examination:    General Appearance: NAD    Neurologic examination:    Mental status:  Patient is alert, attentive, and oriented x 3.  Language is coherent and fluent without aphasia.  Memory, comprehension and ability to follow commands  "were intact.       Cranial nerves:  Dysarthria    Motor exam: Not assessed by video    Complex motor skills: No tremor or ataxia    Sensory exam:Not assessed by video    Gait: Not assessed by video    Deep tendon reflexes: Not assessed by video     Assessment:    Mohsen \"Margarito\" Dania is a 59 year old man with ACHr ab positive non-thymomatous generalized myasthenia gravis. Very challenging to work out his disability from cerebral palsy from MG related disability. Considering that he reports more dysarthria, ptosis and head drop, I am concerned that his MG is under treated - especially considering that he his assisted living has weaned off prednisone. I am concerned about the added risk of immunosuppression in this man. Considering this, adding IVIG may be the best option for him. We discussed this in some detail. He would like to give it a try. All questions answered. Will proceed as below.     Plan:      1. Immunotherapy:   - Not currently taking prednisone    - IST: No indication for immunotherapy above prednisone at this time  - Start IVIG 1 gm/kg q 4 weeks. May increase or decrease pending clinical outcome  2. Symptomatic therapy:   - Continue mestinon 60 mg TID   3. SOB: Stable. May repeat PFT (PFT testing in 11/23 was unsuccessful) if needed.  4. CT chest 6/23 showed no suspicious mass within the chest to suggest the presence of thymoma. There was a solid 4 mm pulmonary nodule at the right lower lobe and also a homogeneously enhancing 1.0 cm left adrenal nodule, presumably benign, such as adenoma. Will repeat CT  to assess for interval change.  5. Discussed potential MG triggers including heat, surgery, and illness. Certain medications and over the counter preparations may also cause worsening of MG symptoms. I advised him to tell any doctor or dentist about MG diagnosis. A list of cautionary medications can be found at [email or web address].  6. Follow up in 4 months. " Sooner if needed.    Time spent on video and patient care: 35 minutes  ---

## 2025-01-14 NOTE — LETTER
"1/14/2025       RE: Mohsen Najera  [address]  [place] [address]     Dear Colleague,    Thank you for referring your patient, Mohsen Najera, to the  [facility] at [facility]. Please see a copy of my visit note below.    History of MG:    Mohsen \"Margarito\" Dania is a 59 year old man with ACHr ab positive generalized myasthenia gravis. He has a history of cerebral palsy. MG symptoms were first noticed in late 2022. The first symptom was left eyelid ptosis. Over the following weeks and months the right also became affected. No diplopia. In the spring 2023 he developed head drop. He has baseline slurred speech from cerebral palsy, but over the last couple months his speech worsened. No dysphagia. No limb weakness. In February 2023 he was found to have Achr ab elevations. In 6/23 he started prednisone 30 mg daily. During 11/23 visit, ptosis had normalized and head drop had improved, but unfortunately he had gained ~20 pounds. At this time we made the decision to taper his daily prednisone down to 10mg.    Interval history:  We last saw him 8/13/24. He continues to struggle with difficulty swallowing and slurred speech. He sometimes chokes on food, but not every day. He has slurred speech, but probably at his baseline for his cerebral palsy. He seems to be having more head drop and eyelid drooping. No shortness of breath. He is able to walk at his baseline. There is some confusion about his prednisone. He was supposed to be on 10 mg, but it seems that he is no longer taking it.     Prior pertinent laboratory work-up:  2/6/23: ACHr ab 2.6     Prior pertinent radiology work-up:  12/22: MRI brain showed no explanation for his symptoms  6/1/23: CT chest showed no suspicious mass within the chest to suggest the presence of thymoma. There was a solid 4 mm pulmonary nodule at the right lower lobe and also a homogeneously enhancing 1.0 cm " left adrenal nodule, presumably benign, such as adenoma.     Prior electrophysiologic work-up:  None    Past Medical History:   Cerebral palsy  HTN  Myasthenia gravis     Past Surgical History:  None as an adult     Family history:    There is no known family history of hereditary neuropathies or other neuromuscular disorders.     Social History:    Rare alcohol. He denies tobacco or illicit drug use.      Medical Allergies:  No Known Allergies     Current Medications:    Current Outpatient Medications:      acetaminophen (TYLENOL) 500 MG tablet, TAKE 2 TABLETS BY MOUTH EVERY 8 HOURS PRN, Disp: , Rfl:      amLODIPine (NORVASC) 5 MG tablet, Take 5 mg by mouth daily, Disp: , Rfl:      dicyclomine (BENTYL) 10 MG capsule, Take 10 mg by mouth daily, Disp: , Rfl:      FLUoxetine (PROZAC) 10 MG capsule, Take 10 mg by mouth daily, Disp: , Rfl:      FLUoxetine (PROZAC) 20 MG capsule, Take 20 mg by mouth daily, Disp: , Rfl:      hydrOXYzine HCl (ATARAX) 25 MG tablet, Take 25 mg by mouth, Disp: , Rfl:      loratadine (CLARITIN) 10 MG tablet, Take 10 mg by mouth daily, Disp: , Rfl:      nystatin (MYCOSTATIN) 194289 UNIT/GM external powder, Apply topically two times a day. Indications: Skin Infection due to Candida Yeast, Disp: , Rfl:      omeprazole (PRILOSEC) 20 MG DR capsule, Take 20 mg by mouth daily, Disp: , Rfl:      pyRIDostigmine (MESTINON) 60 MG tablet, Take 1 tablet (60 mg) by mouth 4 times daily (Patient taking differently: Take 60 mg by mouth 3 times daily.), Disp: 120 tablet, Rfl: 3     ergocalciferol (ERGOCALCIFEROL) 1.25 MG (74407 UT) capsule, TAKE 1 CAPSULE BY MOUTH WEEKLY (Patient not taking: Reported on 1/14/2025), Disp: , Rfl:      predniSONE (DELTASONE) 10 MG tablet, Take 1 tablet (10 mg) by mouth daily, Disp: 90 tablet, Rfl: 3     SUMAtriptan (IMITREX) 50 MG tablet, Take 50 mg by mouth daily as needed (Patient not taking: Reported on 1/14/2025), Disp: , Rfl:     Review of Systems: A complete review of  "systems was obtained and was negative except for what was noted above.    Physical examination:    General Appearance: NAD    Neurologic examination:    Mental status:  Patient is alert, attentive, and oriented x 3.  Language is coherent and fluent without aphasia.  Memory, comprehension and ability to follow commands were intact.       Cranial nerves:  Dysarthria    Motor exam: Not assessed by video    Complex motor skills: No tremor or ataxia    Sensory exam:Not assessed by video    Gait: Not assessed by video    Deep tendon reflexes: Not assessed by video     Assessment:    Mohsen \"Margarito\" Dania is a 59 year old man with ACHr ab positive non-thymomatous generalized myasthenia gravis. Very challenging to work out his disability from cerebral palsy from MG related disability. Considering that he reports more dysarthria, ptosis and head drop, I am concerned that his MG is under treated - especially considering that he his assisted living has weaned off prednisone. I am concerned about the added risk of immunosuppression in this man. Considering this, adding IVIG may be the best option for him. We discussed this in some detail. He would like to give it a try. All questions answered. Will proceed as below.     Plan:      1. Immunotherapy:   - Not currently taking prednisone    - IST: No indication for immunotherapy above prednisone at this time  - Start IVIG 1 gm/kg q 4 weeks. May increase or decrease pending clinical outcome  2. Symptomatic therapy:   - Continue mestinon 60 mg TID   3. SOB: Stable. May repeat PFT (PFT testing in 11/23 was unsuccessful) if needed.  4. CT chest 6/23 showed no suspicious mass within the chest to suggest the presence of thymoma. There was a solid 4 mm pulmonary nodule at the right lower lobe and also a homogeneously enhancing 1.0 cm left adrenal nodule, presumably benign, such as adenoma. Will repeat CT  to assess for interval change.  5. Discussed potential MG triggers including heat, " surgery, and illness. Certain medications and over the counter preparations may also cause worsening of MG symptoms. I advised him to tell any doctor or dentist about MG diagnosis. A list of cautionary medications can be found at [email or web address].  6. Follow up in 4 months. Sooner if needed.    Time spent on video and patient care: 35 minutes  ---      Again, thank you for allowing me to participate in the care of your patient.      Sincerely,    Jovany Lion MD

## 2025-01-14 NOTE — TELEPHONE ENCOUNTER
Margarito to begin IVIG infusions. Will send referral to [facility], as they have a suite in [place] and Margarito will be moving there soon.  Orders pended to Dr. Lion and home referral placed.    Rima Pastor RN     movement

## 2025-01-15 NOTE — TELEPHONE ENCOUNTER
Received a message from [facility] that [facility] does NOT have coverage to infuse IVIG through [facility]. Referral cancelled. Will follow up with Margarito on an infusion center site.    Rima Pastor RN

## 2025-01-17 NOTE — TELEPHONE ENCOUNTER
Spoke with [facility] . Laura will schedule Margarito for an IVIG infusion at . He will be moving to [place] eventually but will plan on having an infusion at  first.    Rima Pastor RN

## 2025-01-30 ENCOUNTER — DOCUMENTATION ONLY (OUTPATIENT)
Dept: PHARMACY | Facility: CLINIC | Age: 60
End: 2025-01-30
Payer: MEDICARE

## 2025-01-30 NOTE — PROGRESS NOTES
Pharmacist IVIG Stewardship Program     Diagnosis: Myasthenia Gravis   Diagnosed in 2022 with ACHr ab positive MG  Per chart notes in February 2023 he was found to have Achr ab elevations. In 6/23 he started prednisone 30 mg daily. During 11/23 visit, ptosis had normalized and head drop had improved, but unfortunately he had gained ~20 pounds. At this time the decision was made to taper his daily prednisone down to 10mg.  Patient was admitted to [facility] in 8/2024 for MG exacerbation they were treated with IVIG while admitted which resulted in restabilization.   Do to continued deficits following his inpatient stay the decision was made to initiate IVIG outpatient.  Previous Treatment Failures: Patient has cerebral palsy and is not a candidate for immunosuppressants.     Current Dosing Regimen: Privigen 55g IV every 28 days   Pre-medications:  Acetaminophen 650 mg by mouth, 30 minutes prior to infusion  Diphenhydramine 50 mg by mouth, 30 minutes prior to infusion  Hydrocortisone 100 mg IV, 30 minutes prior to infusion   Current Titration Regimen: Start infusion at 0.5 ml/kg/hr x 15 min. If tolerated, increase rate by 0.5 ml/kg/hr every 15 min to a maximum of 4 ml/kg/hr.   Previously Tried Ig Products: None    Side Effects: Unable to reach patient to discuss.     Interventions: Chart review completed.     Assessment: Patient is appropriate for therapy with IVIG. Patient is not a candidate for traditional immunosuppressant therapy due to their other conditions and prednisone is not adequate to manage their Myasthenia Gravis. Additionally, patient was treated with IVIG during hospitalization in 8/2024 with improvement in symptoms following treatment. Patient will be monitored throughout therapy to assess efficacy. Without IVIG therapy at this time patient would be at risk for rehospitalization and additional complications.     Dose Reduction Attempt: Patient is being initiated on therapy currently, he is not a  candidate for dose reduction at this time.     Plan: Patient is okay to proceed with infusions as planned through 5/2025 without any insurance mandated laboratory parameters.     Next Review Needed: 4/2025    Yael Bolden PharmD, IgCP  Medication Access Pharmacist

## 2025-02-05 ENCOUNTER — INFUSION THERAPY VISIT (OUTPATIENT)
Dept: INFUSION THERAPY | Facility: HOSPITAL | Age: 60
End: 2025-02-05
Attending: PSYCHIATRY & NEUROLOGY
Payer: MEDICARE

## 2025-02-05 VITALS
DIASTOLIC BLOOD PRESSURE: 80 MMHG | OXYGEN SATURATION: 96 % | HEART RATE: 80 BPM | WEIGHT: 170 LBS | TEMPERATURE: 97.4 F | HEIGHT: 67 IN | BODY MASS INDEX: 26.68 KG/M2 | SYSTOLIC BLOOD PRESSURE: 117 MMHG | RESPIRATION RATE: 18 BRPM

## 2025-02-05 DIAGNOSIS — G70.00 MYASTHENIA GRAVIS WITHOUT EXACERBATION (H): Primary | ICD-10-CM

## 2025-02-05 PROCEDURE — 96366 THER/PROPH/DIAG IV INF ADDON: CPT

## 2025-02-05 PROCEDURE — 96375 TX/PRO/DX INJ NEW DRUG ADDON: CPT

## 2025-02-05 PROCEDURE — 96365 THER/PROPH/DIAG IV INF INIT: CPT

## 2025-02-05 PROCEDURE — 250N000011 HC RX IP 250 OP 636: Performed by: PSYCHIATRY & NEUROLOGY

## 2025-02-05 PROCEDURE — 258N000003 HC RX IP 258 OP 636: Performed by: PSYCHIATRY & NEUROLOGY

## 2025-02-05 PROCEDURE — 250N000013 HC RX MED GY IP 250 OP 250 PS 637: Performed by: PSYCHIATRY & NEUROLOGY

## 2025-02-05 RX ORDER — DIPHENHYDRAMINE HYDROCHLORIDE 50 MG/ML
50 INJECTION INTRAMUSCULAR; INTRAVENOUS
Start: 2025-02-05

## 2025-02-05 RX ORDER — DIPHENHYDRAMINE HYDROCHLORIDE 50 MG/ML
25 INJECTION INTRAMUSCULAR; INTRAVENOUS
Status: DISCONTINUED | OUTPATIENT
Start: 2025-02-05 | End: 2025-02-05 | Stop reason: HOSPADM

## 2025-02-05 RX ORDER — ACETAMINOPHEN 325 MG/1
650 TABLET ORAL ONCE
Status: COMPLETED | OUTPATIENT
Start: 2025-02-05 | End: 2025-02-05

## 2025-02-05 RX ORDER — METHYLPREDNISOLONE SODIUM SUCCINATE 40 MG/ML
40 INJECTION INTRAMUSCULAR; INTRAVENOUS
Start: 2025-02-05

## 2025-02-05 RX ORDER — HYDROCORTISONE SODIUM SUCCINATE 100 MG/2ML
100 INJECTION INTRAMUSCULAR; INTRAVENOUS ONCE
OUTPATIENT
Start: 2025-02-05

## 2025-02-05 RX ORDER — DIPHENHYDRAMINE HYDROCHLORIDE 50 MG/ML
25 INJECTION INTRAMUSCULAR; INTRAVENOUS
Start: 2025-02-05

## 2025-02-05 RX ORDER — MEPERIDINE HYDROCHLORIDE 25 MG/ML
25 INJECTION INTRAMUSCULAR; INTRAVENOUS; SUBCUTANEOUS
OUTPATIENT
Start: 2025-02-05

## 2025-02-05 RX ORDER — HEPARIN SODIUM (PORCINE) LOCK FLUSH IV SOLN 100 UNIT/ML 100 UNIT/ML
5 SOLUTION INTRAVENOUS
OUTPATIENT
Start: 2025-02-05

## 2025-02-05 RX ORDER — ALBUTEROL SULFATE 90 UG/1
1-2 INHALANT RESPIRATORY (INHALATION)
Status: DISCONTINUED | OUTPATIENT
Start: 2025-02-05 | End: 2025-02-05 | Stop reason: HOSPADM

## 2025-02-05 RX ORDER — HYDROCORTISONE SODIUM SUCCINATE 100 MG/2ML
100 INJECTION INTRAMUSCULAR; INTRAVENOUS ONCE
Status: COMPLETED | OUTPATIENT
Start: 2025-02-05 | End: 2025-02-05

## 2025-02-05 RX ORDER — HEPARIN SODIUM,PORCINE 10 UNIT/ML
5-20 VIAL (ML) INTRAVENOUS DAILY PRN
OUTPATIENT
Start: 2025-02-05

## 2025-02-05 RX ORDER — MEPERIDINE HYDROCHLORIDE 25 MG/ML
25 INJECTION INTRAMUSCULAR; INTRAVENOUS; SUBCUTANEOUS
Status: DISCONTINUED | OUTPATIENT
Start: 2025-02-05 | End: 2025-02-05 | Stop reason: HOSPADM

## 2025-02-05 RX ORDER — DIPHENHYDRAMINE HYDROCHLORIDE 50 MG/ML
50 INJECTION INTRAMUSCULAR; INTRAVENOUS
Status: DISCONTINUED | OUTPATIENT
Start: 2025-02-05 | End: 2025-02-05 | Stop reason: HOSPADM

## 2025-02-05 RX ORDER — EPINEPHRINE 1 MG/ML
0.3 INJECTION, SOLUTION INTRAMUSCULAR; SUBCUTANEOUS EVERY 5 MIN PRN
Status: DISCONTINUED | OUTPATIENT
Start: 2025-02-05 | End: 2025-02-05 | Stop reason: HOSPADM

## 2025-02-05 RX ORDER — DIPHENHYDRAMINE HCL 50 MG
50 CAPSULE ORAL ONCE
Status: COMPLETED | OUTPATIENT
Start: 2025-02-05 | End: 2025-02-05

## 2025-02-05 RX ORDER — EPINEPHRINE 1 MG/ML
0.3 INJECTION, SOLUTION INTRAMUSCULAR; SUBCUTANEOUS EVERY 5 MIN PRN
OUTPATIENT
Start: 2025-02-05

## 2025-02-05 RX ORDER — ALBUTEROL SULFATE 0.83 MG/ML
2.5 SOLUTION RESPIRATORY (INHALATION)
Status: DISCONTINUED | OUTPATIENT
Start: 2025-02-05 | End: 2025-02-05 | Stop reason: HOSPADM

## 2025-02-05 RX ORDER — DIPHENHYDRAMINE HCL 50 MG
50 CAPSULE ORAL ONCE
OUTPATIENT
Start: 2025-02-05

## 2025-02-05 RX ORDER — ACETAMINOPHEN 325 MG/1
650 TABLET ORAL ONCE
Start: 2025-02-05

## 2025-02-05 RX ORDER — ALBUTEROL SULFATE 90 UG/1
1-2 INHALANT RESPIRATORY (INHALATION)
Start: 2025-02-05

## 2025-02-05 RX ORDER — ALBUTEROL SULFATE 0.83 MG/ML
2.5 SOLUTION RESPIRATORY (INHALATION)
OUTPATIENT
Start: 2025-02-05

## 2025-02-05 RX ORDER — METHYLPREDNISOLONE SODIUM SUCCINATE 40 MG/ML
40 INJECTION INTRAMUSCULAR; INTRAVENOUS
Status: DISCONTINUED | OUTPATIENT
Start: 2025-02-05 | End: 2025-02-05 | Stop reason: HOSPADM

## 2025-02-05 RX ADMIN — HUMAN IMMUNOGLOBULIN G 60 G: 40 LIQUID INTRAVENOUS at 10:38

## 2025-02-05 RX ADMIN — ACETAMINOPHEN 650 MG: 325 TABLET ORAL at 10:01

## 2025-02-05 RX ADMIN — DIPHENHYDRAMINE HYDROCHLORIDE 50 MG: 50 CAPSULE ORAL at 10:10

## 2025-02-05 RX ADMIN — HYDROCORTISONE SODIUM SUCCINATE 100 MG: 100 INJECTION, POWDER, FOR SOLUTION INTRAMUSCULAR; INTRAVENOUS at 10:04

## 2025-02-05 RX ADMIN — SODIUM CHLORIDE 100 ML: 9 INJECTION, SOLUTION INTRAVENOUS at 10:44

## 2025-02-05 NOTE — PROGRESS NOTES
Infusion Nursing Note:  Mohsen Najera presents today for first IVIG infusion.    Patient seen by provider today: No   present during visit today: Not Applicable.    Note: Reviewed plan of care with patient and , Josiane. Patient has CP, uses walker but writer had difficulty understanding patient's speech. Patient premedicated with IV Solu-cortef and po acetaminophen and diphenhydramine 30 min prior to start of IVIG.     VSS throughout infusion. Patient received 100 ml NS per orders.      Intravenous Access:  Peripheral IV placed by Rima CARPENTER, patent throughout.    Treatment Conditions:  Biological Infusion Checklist:  ~~~ NOTE: If the patient answers yes to any of the questions below, hold the infusion and contact ordering provider or on-call provider.    Have you recently had an elevated temperature, fever, chills, productive cough, coughing for 3 weeks or longer or hemoptysis,  abnormal vital signs, night sweats,  chest pain or have you noticed a decrease in your appetite, unexplained weight loss or fatigue? No  Do you have any open wounds or new incisions? No  Do you have any upcoming hospitalizations or surgeries? Does not include esophagogastroduodenoscopy, colonoscopy, endoscopic retrograde cholangiopancreatography (ERCP), endoscopic ultrasound (EUS), dental procedures or joint aspiration/steroid injections No  Do you currently have any signs of illness or infection or are you on any antibiotics? No  Have you had any new, sudden or worsening abdominal pain? No  Have you or anyone in your household received a live vaccination in the past 4 weeks? Please note: No live vaccines while on biologic/chemotherapy until 6 months after the last treatment. Patient can receive the flu vaccine (shot only), pneumovax and the Covid vaccine. It is optimal for the patient to get these vaccines mid cycle, but they can be given at any time as long as it is not on the day of the infusion. No  Have you recently  been diagnosed with any new nervous system diseases (ie. Multiple sclerosis, Guillain [place], seizures, neurological changes) or cancer diagnosis? Are you on any form of radiation or chemotherapy? No  Are you pregnant or breast feeding or do you have plans of pregnancy in the future? No  Have you been having any signs of worsening depression or suicidal ideations?  (benlysta only) No  Have there been any other new onset medical symptoms? No  Have you had any new blood clots? (IVIG only) No      Post Infusion Assessment:  Patient tolerated infusion without incident.  Blood return noted pre and post infusion.  Site patent and intact, free from redness, edema or discomfort.  Access discontinued per protocol by Ana MAYES       Discharge Plan:   Patient will not return for another appointment as he is moving to [facility] on Saturday.   Patient discharged in stable condition accompanied by: self.  Departure Mode: Ambulatory with chair walker.      Brigid Mooney RN

## 2025-02-06 NOTE — TELEPHONE ENCOUNTER
Spoke with Margarito [facility] .  Margarito has moved to [place] so he will no longer be following Dr. Lion. Viviane requesting a referral be sent to [facility] (Dr. Carlton or Dr. Ignacio).  Referral faxed to [facility] (phone [phone], fax [phone]). At last appointment Dr. Lion had ordered a CT scan to assess for any changes to known nodule.  Requesting this order be sent to [facility]. Will fax accordingly (phone [phone], fax [phone]).    Rima Pastor RN